# Patient Record
Sex: MALE | Race: WHITE | NOT HISPANIC OR LATINO | Employment: FULL TIME | URBAN - METROPOLITAN AREA
[De-identification: names, ages, dates, MRNs, and addresses within clinical notes are randomized per-mention and may not be internally consistent; named-entity substitution may affect disease eponyms.]

---

## 2023-03-03 LAB
ALANINE AMINOTRANSFERASE (SGPT) (U/L) IN SER/PLAS: 37 U/L (ref 10–52)
ALBUMIN (G/DL) IN SER/PLAS: 4.4 G/DL (ref 3.4–5)
ALKALINE PHOSPHATASE (U/L) IN SER/PLAS: 72 U/L (ref 33–120)
ANION GAP IN SER/PLAS: 13 MMOL/L (ref 10–20)
ASPARTATE AMINOTRANSFERASE (SGOT) (U/L) IN SER/PLAS: 51 U/L (ref 9–39)
BASOPHILS (10*3/UL) IN BLOOD BY AUTOMATED COUNT: 0.04 X10E9/L (ref 0–0.1)
BASOPHILS/100 LEUKOCYTES IN BLOOD BY AUTOMATED COUNT: 0.4 % (ref 0–2)
BILIRUBIN TOTAL (MG/DL) IN SER/PLAS: 0.5 MG/DL (ref 0–1.2)
C REACTIVE PROTEIN (MG/L) IN SER/PLAS: 0.66 MG/DL
CALCIUM (MG/DL) IN SER/PLAS: 9.5 MG/DL (ref 8.6–10.3)
CARBON DIOXIDE, TOTAL (MMOL/L) IN SER/PLAS: 25 MMOL/L (ref 21–32)
CHLORIDE (MMOL/L) IN SER/PLAS: 103 MMOL/L (ref 98–107)
CREATININE (MG/DL) IN SER/PLAS: 0.67 MG/DL (ref 0.5–1.3)
EOSINOPHILS (10*3/UL) IN BLOOD BY AUTOMATED COUNT: 0.32 X10E9/L (ref 0–0.7)
EOSINOPHILS/100 LEUKOCYTES IN BLOOD BY AUTOMATED COUNT: 3.5 % (ref 0–6)
ERYTHROCYTE DISTRIBUTION WIDTH (RATIO) BY AUTOMATED COUNT: 13.3 % (ref 11.5–14.5)
ERYTHROCYTE MEAN CORPUSCULAR HEMOGLOBIN CONCENTRATION (G/DL) BY AUTOMATED: 32.1 G/DL (ref 32–36)
ERYTHROCYTE MEAN CORPUSCULAR VOLUME (FL) BY AUTOMATED COUNT: 86 FL (ref 80–100)
ERYTHROCYTES (10*6/UL) IN BLOOD BY AUTOMATED COUNT: 5.49 X10E12/L (ref 4.5–5.9)
GFR MALE: >90 ML/MIN/1.73M2
GLUCOSE (MG/DL) IN SER/PLAS: 63 MG/DL (ref 74–99)
HEMATOCRIT (%) IN BLOOD BY AUTOMATED COUNT: 47.4 % (ref 41–52)
HEMOGLOBIN (G/DL) IN BLOOD: 15.2 G/DL (ref 13.5–17.5)
IMMATURE GRANULOCYTES/100 LEUKOCYTES IN BLOOD BY AUTOMATED COUNT: 0.3 % (ref 0–0.9)
LEUKOCYTES (10*3/UL) IN BLOOD BY AUTOMATED COUNT: 9.1 X10E9/L (ref 4.4–11.3)
LYMPHOCYTES (10*3/UL) IN BLOOD BY AUTOMATED COUNT: 2.72 X10E9/L (ref 1.2–4.8)
LYMPHOCYTES/100 LEUKOCYTES IN BLOOD BY AUTOMATED COUNT: 30 % (ref 13–44)
MONOCYTES (10*3/UL) IN BLOOD BY AUTOMATED COUNT: 0.7 X10E9/L (ref 0.1–1)
MONOCYTES/100 LEUKOCYTES IN BLOOD BY AUTOMATED COUNT: 7.7 % (ref 2–10)
NEUTROPHILS (10*3/UL) IN BLOOD BY AUTOMATED COUNT: 5.26 X10E9/L (ref 1.2–7.7)
NEUTROPHILS/100 LEUKOCYTES IN BLOOD BY AUTOMATED COUNT: 58.1 % (ref 40–80)
PLATELETS (10*3/UL) IN BLOOD AUTOMATED COUNT: 275 X10E9/L (ref 150–450)
POTASSIUM (MMOL/L) IN SER/PLAS: 5.2 MMOL/L (ref 3.5–5.3)
PROTEIN TOTAL: 7.4 G/DL (ref 6.4–8.2)
SEDIMENTATION RATE, ERYTHROCYTE: 11 MM/H (ref 0–15)
SODIUM (MMOL/L) IN SER/PLAS: 136 MMOL/L (ref 136–145)
UREA NITROGEN (MG/DL) IN SER/PLAS: 16 MG/DL (ref 6–23)

## 2023-04-14 LAB
ALANINE AMINOTRANSFERASE (SGPT) (U/L) IN SER/PLAS: 33 U/L (ref 10–52)
ALBUMIN (G/DL) IN SER/PLAS: 4.1 G/DL (ref 3.4–5)
ALKALINE PHOSPHATASE (U/L) IN SER/PLAS: 67 U/L (ref 33–120)
ANION GAP IN SER/PLAS: 14 MMOL/L (ref 10–20)
ASPARTATE AMINOTRANSFERASE (SGOT) (U/L) IN SER/PLAS: 25 U/L (ref 9–39)
BASOPHILS (10*3/UL) IN BLOOD BY AUTOMATED COUNT: 0.05 X10E9/L (ref 0–0.1)
BASOPHILS/100 LEUKOCYTES IN BLOOD BY AUTOMATED COUNT: 0.5 % (ref 0–2)
BILIRUBIN TOTAL (MG/DL) IN SER/PLAS: 0.7 MG/DL (ref 0–1.2)
C REACTIVE PROTEIN (MG/L) IN SER/PLAS: 0.72 MG/DL
CALCIUM (MG/DL) IN SER/PLAS: 8.8 MG/DL (ref 8.6–10.3)
CARBON DIOXIDE, TOTAL (MMOL/L) IN SER/PLAS: 23 MMOL/L (ref 21–32)
CHLORIDE (MMOL/L) IN SER/PLAS: 105 MMOL/L (ref 98–107)
CREATININE (MG/DL) IN SER/PLAS: 0.7 MG/DL (ref 0.5–1.3)
EOSINOPHILS (10*3/UL) IN BLOOD BY AUTOMATED COUNT: 0.29 X10E9/L (ref 0–0.7)
EOSINOPHILS/100 LEUKOCYTES IN BLOOD BY AUTOMATED COUNT: 2.7 % (ref 0–6)
ERYTHROCYTE DISTRIBUTION WIDTH (RATIO) BY AUTOMATED COUNT: 13 % (ref 11.5–14.5)
ERYTHROCYTE MEAN CORPUSCULAR HEMOGLOBIN CONCENTRATION (G/DL) BY AUTOMATED: 32.8 G/DL (ref 32–36)
ERYTHROCYTE MEAN CORPUSCULAR VOLUME (FL) BY AUTOMATED COUNT: 85 FL (ref 80–100)
ERYTHROCYTES (10*6/UL) IN BLOOD BY AUTOMATED COUNT: 5.34 X10E12/L (ref 4.5–5.9)
GFR MALE: >90 ML/MIN/1.73M2
GLUCOSE (MG/DL) IN SER/PLAS: 80 MG/DL (ref 74–99)
HEMATOCRIT (%) IN BLOOD BY AUTOMATED COUNT: 45.4 % (ref 41–52)
HEMOGLOBIN (G/DL) IN BLOOD: 14.9 G/DL (ref 13.5–17.5)
IMMATURE GRANULOCYTES/100 LEUKOCYTES IN BLOOD BY AUTOMATED COUNT: 0.3 % (ref 0–0.9)
LEUKOCYTES (10*3/UL) IN BLOOD BY AUTOMATED COUNT: 10.6 X10E9/L (ref 4.4–11.3)
LYMPHOCYTES (10*3/UL) IN BLOOD BY AUTOMATED COUNT: 3.54 X10E9/L (ref 1.2–4.8)
LYMPHOCYTES/100 LEUKOCYTES IN BLOOD BY AUTOMATED COUNT: 33.6 % (ref 13–44)
MONOCYTES (10*3/UL) IN BLOOD BY AUTOMATED COUNT: 0.77 X10E9/L (ref 0.1–1)
MONOCYTES/100 LEUKOCYTES IN BLOOD BY AUTOMATED COUNT: 7.3 % (ref 2–10)
NEUTROPHILS (10*3/UL) IN BLOOD BY AUTOMATED COUNT: 5.87 X10E9/L (ref 1.2–7.7)
NEUTROPHILS/100 LEUKOCYTES IN BLOOD BY AUTOMATED COUNT: 55.6 % (ref 40–80)
PLATELETS (10*3/UL) IN BLOOD AUTOMATED COUNT: 257 X10E9/L (ref 150–450)
POTASSIUM (MMOL/L) IN SER/PLAS: 3.7 MMOL/L (ref 3.5–5.3)
PROTEIN TOTAL: 6.9 G/DL (ref 6.4–8.2)
SEDIMENTATION RATE, ERYTHROCYTE: 15 MM/H (ref 0–15)
SODIUM (MMOL/L) IN SER/PLAS: 138 MMOL/L (ref 136–145)
UREA NITROGEN (MG/DL) IN SER/PLAS: 13 MG/DL (ref 6–23)

## 2023-05-26 LAB
ALANINE AMINOTRANSFERASE (SGPT) (U/L) IN SER/PLAS: 38 U/L (ref 10–52)
ALBUMIN (G/DL) IN SER/PLAS: 4.3 G/DL (ref 3.4–5)
ALKALINE PHOSPHATASE (U/L) IN SER/PLAS: 79 U/L (ref 33–120)
ANION GAP IN SER/PLAS: 11 MMOL/L (ref 10–20)
ASPARTATE AMINOTRANSFERASE (SGOT) (U/L) IN SER/PLAS: 29 U/L (ref 9–39)
BASOPHILS (10*3/UL) IN BLOOD BY AUTOMATED COUNT: 0.05 X10E9/L (ref 0–0.1)
BASOPHILS/100 LEUKOCYTES IN BLOOD BY AUTOMATED COUNT: 0.5 % (ref 0–2)
BILIRUBIN TOTAL (MG/DL) IN SER/PLAS: 0.6 MG/DL (ref 0–1.2)
C REACTIVE PROTEIN (MG/L) IN SER/PLAS: 0.59 MG/DL
CALCIUM (MG/DL) IN SER/PLAS: 9.4 MG/DL (ref 8.6–10.3)
CARBON DIOXIDE, TOTAL (MMOL/L) IN SER/PLAS: 28 MMOL/L (ref 21–32)
CHLORIDE (MMOL/L) IN SER/PLAS: 105 MMOL/L (ref 98–107)
CREATININE (MG/DL) IN SER/PLAS: 0.67 MG/DL (ref 0.5–1.3)
EOSINOPHILS (10*3/UL) IN BLOOD BY AUTOMATED COUNT: 0.37 X10E9/L (ref 0–0.7)
EOSINOPHILS/100 LEUKOCYTES IN BLOOD BY AUTOMATED COUNT: 4.1 % (ref 0–6)
ERYTHROCYTE DISTRIBUTION WIDTH (RATIO) BY AUTOMATED COUNT: 13.2 % (ref 11.5–14.5)
ERYTHROCYTE MEAN CORPUSCULAR HEMOGLOBIN CONCENTRATION (G/DL) BY AUTOMATED: 32.4 G/DL (ref 32–36)
ERYTHROCYTE MEAN CORPUSCULAR VOLUME (FL) BY AUTOMATED COUNT: 87 FL (ref 80–100)
ERYTHROCYTES (10*6/UL) IN BLOOD BY AUTOMATED COUNT: 5.78 X10E12/L (ref 4.5–5.9)
GFR MALE: >90 ML/MIN/1.73M2
GLUCOSE (MG/DL) IN SER/PLAS: 76 MG/DL (ref 74–99)
HEMATOCRIT (%) IN BLOOD BY AUTOMATED COUNT: 50 % (ref 41–52)
HEMOGLOBIN (G/DL) IN BLOOD: 16.2 G/DL (ref 13.5–17.5)
IMMATURE GRANULOCYTES/100 LEUKOCYTES IN BLOOD BY AUTOMATED COUNT: 0.3 % (ref 0–0.9)
LEUKOCYTES (10*3/UL) IN BLOOD BY AUTOMATED COUNT: 9.1 X10E9/L (ref 4.4–11.3)
LYMPHOCYTES (10*3/UL) IN BLOOD BY AUTOMATED COUNT: 3.19 X10E9/L (ref 1.2–4.8)
LYMPHOCYTES/100 LEUKOCYTES IN BLOOD BY AUTOMATED COUNT: 34.9 % (ref 13–44)
MONOCYTES (10*3/UL) IN BLOOD BY AUTOMATED COUNT: 0.61 X10E9/L (ref 0.1–1)
MONOCYTES/100 LEUKOCYTES IN BLOOD BY AUTOMATED COUNT: 6.7 % (ref 2–10)
NEUTROPHILS (10*3/UL) IN BLOOD BY AUTOMATED COUNT: 4.88 X10E9/L (ref 1.2–7.7)
NEUTROPHILS/100 LEUKOCYTES IN BLOOD BY AUTOMATED COUNT: 53.5 % (ref 40–80)
PLATELETS (10*3/UL) IN BLOOD AUTOMATED COUNT: 259 X10E9/L (ref 150–450)
POTASSIUM (MMOL/L) IN SER/PLAS: 3.9 MMOL/L (ref 3.5–5.3)
PROTEIN TOTAL: 7.3 G/DL (ref 6.4–8.2)
SEDIMENTATION RATE, ERYTHROCYTE: 10 MM/H (ref 0–15)
SODIUM (MMOL/L) IN SER/PLAS: 140 MMOL/L (ref 136–145)
UREA NITROGEN (MG/DL) IN SER/PLAS: 13 MG/DL (ref 6–23)

## 2023-07-07 LAB
ALANINE AMINOTRANSFERASE (SGPT) (U/L) IN SER/PLAS: 30 U/L (ref 10–52)
ALBUMIN (G/DL) IN SER/PLAS: 4 G/DL (ref 3.4–5)
ALKALINE PHOSPHATASE (U/L) IN SER/PLAS: 73 U/L (ref 33–120)
ANION GAP IN SER/PLAS: 15 MMOL/L (ref 10–20)
ASPARTATE AMINOTRANSFERASE (SGOT) (U/L) IN SER/PLAS: 22 U/L (ref 9–39)
BASOPHILS (10*3/UL) IN BLOOD BY AUTOMATED COUNT: 0.05 X10E9/L (ref 0–0.1)
BASOPHILS/100 LEUKOCYTES IN BLOOD BY AUTOMATED COUNT: 0.5 % (ref 0–2)
BILIRUBIN TOTAL (MG/DL) IN SER/PLAS: 0.5 MG/DL (ref 0–1.2)
C REACTIVE PROTEIN (MG/L) IN SER/PLAS: 0.92 MG/DL
CALCIUM (MG/DL) IN SER/PLAS: 8.8 MG/DL (ref 8.6–10.3)
CARBON DIOXIDE, TOTAL (MMOL/L) IN SER/PLAS: 24 MMOL/L (ref 21–32)
CHLORIDE (MMOL/L) IN SER/PLAS: 105 MMOL/L (ref 98–107)
CREATININE (MG/DL) IN SER/PLAS: 0.66 MG/DL (ref 0.5–1.3)
EOSINOPHILS (10*3/UL) IN BLOOD BY AUTOMATED COUNT: 0.3 X10E9/L (ref 0–0.7)
EOSINOPHILS/100 LEUKOCYTES IN BLOOD BY AUTOMATED COUNT: 3.2 % (ref 0–6)
ERYTHROCYTE DISTRIBUTION WIDTH (RATIO) BY AUTOMATED COUNT: 13 % (ref 11.5–14.5)
ERYTHROCYTE MEAN CORPUSCULAR HEMOGLOBIN CONCENTRATION (G/DL) BY AUTOMATED: 32.5 G/DL (ref 32–36)
ERYTHROCYTE MEAN CORPUSCULAR VOLUME (FL) BY AUTOMATED COUNT: 87 FL (ref 80–100)
ERYTHROCYTES (10*6/UL) IN BLOOD BY AUTOMATED COUNT: 5.25 X10E12/L (ref 4.5–5.9)
GFR MALE: >90 ML/MIN/1.73M2
GLUCOSE (MG/DL) IN SER/PLAS: 60 MG/DL (ref 74–99)
HEMATOCRIT (%) IN BLOOD BY AUTOMATED COUNT: 45.5 % (ref 41–52)
HEMOGLOBIN (G/DL) IN BLOOD: 14.8 G/DL (ref 13.5–17.5)
IMMATURE GRANULOCYTES/100 LEUKOCYTES IN BLOOD BY AUTOMATED COUNT: 0.1 % (ref 0–0.9)
LEUKOCYTES (10*3/UL) IN BLOOD BY AUTOMATED COUNT: 9.5 X10E9/L (ref 4.4–11.3)
LYMPHOCYTES (10*3/UL) IN BLOOD BY AUTOMATED COUNT: 3.33 X10E9/L (ref 1.2–4.8)
LYMPHOCYTES/100 LEUKOCYTES IN BLOOD BY AUTOMATED COUNT: 35.1 % (ref 13–44)
MONOCYTES (10*3/UL) IN BLOOD BY AUTOMATED COUNT: 0.71 X10E9/L (ref 0.1–1)
MONOCYTES/100 LEUKOCYTES IN BLOOD BY AUTOMATED COUNT: 7.5 % (ref 2–10)
NEUTROPHILS (10*3/UL) IN BLOOD BY AUTOMATED COUNT: 5.09 X10E9/L (ref 1.2–7.7)
NEUTROPHILS/100 LEUKOCYTES IN BLOOD BY AUTOMATED COUNT: 53.6 % (ref 40–80)
PLATELETS (10*3/UL) IN BLOOD AUTOMATED COUNT: 278 X10E9/L (ref 150–450)
POTASSIUM (MMOL/L) IN SER/PLAS: 3.6 MMOL/L (ref 3.5–5.3)
PROTEIN TOTAL: 6.9 G/DL (ref 6.4–8.2)
SEDIMENTATION RATE, ERYTHROCYTE: 17 MM/H (ref 0–15)
SODIUM (MMOL/L) IN SER/PLAS: 140 MMOL/L (ref 136–145)
UREA NITROGEN (MG/DL) IN SER/PLAS: 15 MG/DL (ref 6–23)

## 2023-09-30 ENCOUNTER — LAB (OUTPATIENT)
Dept: LAB | Facility: LAB | Age: 23
End: 2023-09-30
Payer: COMMERCIAL

## 2023-09-30 DIAGNOSIS — Z79.899 OTHER LONG TERM (CURRENT) DRUG THERAPY: ICD-10-CM

## 2023-09-30 DIAGNOSIS — K51.90 ULCERATIVE COLITIS, UNSPECIFIED, WITHOUT COMPLICATIONS (MULTI): Primary | ICD-10-CM

## 2023-09-30 LAB
ALBUMIN SERPL BCP-MCNC: 4.3 G/DL (ref 3.4–5)
ALP SERPL-CCNC: 74 U/L (ref 33–120)
ALT SERPL W P-5'-P-CCNC: 31 U/L (ref 10–52)
ANION GAP SERPL CALC-SCNC: 12 MMOL/L (ref 10–20)
AST SERPL W P-5'-P-CCNC: 22 U/L (ref 9–39)
BASOPHILS # BLD AUTO: 0.08 X10*3/UL (ref 0–0.1)
BASOPHILS NFR BLD AUTO: 0.6 %
BILIRUB SERPL-MCNC: 0.4 MG/DL (ref 0–1.2)
BUN SERPL-MCNC: 13 MG/DL (ref 6–23)
CALCIUM SERPL-MCNC: 9.2 MG/DL (ref 8.6–10.3)
CHLORIDE SERPL-SCNC: 102 MMOL/L (ref 98–107)
CO2 SERPL-SCNC: 28 MMOL/L (ref 21–32)
CREAT SERPL-MCNC: 0.71 MG/DL (ref 0.5–1.3)
CRP SERPL-MCNC: 0.9 MG/DL
EOSINOPHIL # BLD AUTO: 0.46 X10*3/UL (ref 0–0.7)
EOSINOPHIL NFR BLD AUTO: 3.5 %
ERYTHROCYTE [DISTWIDTH] IN BLOOD BY AUTOMATED COUNT: 12.6 % (ref 11.5–14.5)
ERYTHROCYTE [SEDIMENTATION RATE] IN BLOOD BY WESTERGREN METHOD: 15 MM/H (ref 0–15)
GFR SERPL CREATININE-BSD FRML MDRD: >90 ML/MIN/1.73M*2
GLUCOSE SERPL-MCNC: 78 MG/DL (ref 74–99)
HCT VFR BLD AUTO: 46.2 % (ref 41–52)
HGB BLD-MCNC: 15 G/DL (ref 13.5–17.5)
IMM GRANULOCYTES # BLD AUTO: 0.04 X10*3/UL (ref 0–0.7)
IMM GRANULOCYTES NFR BLD AUTO: 0.3 % (ref 0–0.9)
LYMPHOCYTES # BLD AUTO: 3.72 X10*3/UL (ref 1.2–4.8)
LYMPHOCYTES NFR BLD AUTO: 28.6 %
MCH RBC QN AUTO: 27.8 PG (ref 26–34)
MCHC RBC AUTO-ENTMCNC: 32.5 G/DL (ref 32–36)
MCV RBC AUTO: 86 FL (ref 80–100)
MONOCYTES # BLD AUTO: 0.99 X10*3/UL (ref 0.1–1)
MONOCYTES NFR BLD AUTO: 7.6 %
NEUTROPHILS # BLD AUTO: 7.73 X10*3/UL (ref 1.2–7.7)
NEUTROPHILS NFR BLD AUTO: 59.4 %
NRBC BLD-RTO: 0 /100 WBCS (ref 0–0)
PLATELET # BLD AUTO: 270 X10*3/UL (ref 150–450)
PMV BLD AUTO: 11.3 FL (ref 7.5–11.5)
POTASSIUM SERPL-SCNC: 4 MMOL/L (ref 3.5–5.3)
PROT SERPL-MCNC: 7.2 G/DL (ref 6.4–8.2)
RBC # BLD AUTO: 5.39 X10*6/UL (ref 4.5–5.9)
SODIUM SERPL-SCNC: 138 MMOL/L (ref 136–145)
WBC # BLD AUTO: 13 X10*3/UL (ref 4.4–11.3)

## 2023-09-30 PROCEDURE — 36415 COLL VENOUS BLD VENIPUNCTURE: CPT

## 2023-10-03 ENCOUNTER — TELEPHONE (OUTPATIENT)
Dept: GASTROENTEROLOGY | Facility: CLINIC | Age: 23
End: 2023-10-03
Payer: COMMERCIAL

## 2023-10-03 DIAGNOSIS — K51.00 ULCERATIVE PANCOLITIS WITHOUT COMPLICATION (MULTI): Primary | ICD-10-CM

## 2023-10-03 NOTE — RESULT ENCOUNTER NOTE
I called patient and notified him that CBC shows elevated WBC count of 13.  He is otherwise feeling well and no fevers.  Had recent Entyvio infusion a few days ago.  Will repeat CBC in 2 weeks.  He is in agreement with the plan.

## 2023-10-03 NOTE — TELEPHONE ENCOUNTER
Will repeat CBC in 2 weeks since recent CBC shows WBC of 13k.  Patient without symptoms.  He is in agreement with the plan.

## 2023-11-09 ENCOUNTER — TELEPHONE (OUTPATIENT)
Dept: INFUSION THERAPY | Age: 23
End: 2023-11-09
Payer: COMMERCIAL

## 2023-11-09 NOTE — TELEPHONE ENCOUNTER
Spoke with patient. Delivery will be tonight by 9 pm with the  to call ahead of time. Address has been verified. [facility] address.     Sending same supplies as last month for one dose of entyvio delivered peripherally via gravity + necessary flushes.     Patient had no questions for pharmacist.

## 2023-11-10 ENCOUNTER — HOME HEALTH ADMISSION (OUTPATIENT)
Dept: HOME HEALTH SERVICES | Facility: HOME HEALTH | Age: 23
End: 2023-11-10
Payer: COMMERCIAL

## 2023-11-10 ENCOUNTER — LAB REQUISITION (OUTPATIENT)
Dept: LAB | Facility: LAB | Age: 23
End: 2023-11-10
Payer: COMMERCIAL

## 2023-11-10 DIAGNOSIS — K51.90 ULCERATIVE COLITIS, UNSPECIFIED, WITHOUT COMPLICATIONS (MULTI): ICD-10-CM

## 2023-11-10 LAB
ALBUMIN SERPL BCP-MCNC: 4 G/DL (ref 3.4–5)
ALP SERPL-CCNC: 79 U/L (ref 33–120)
ALT SERPL W P-5'-P-CCNC: 61 U/L (ref 10–52)
ANION GAP SERPL CALC-SCNC: 13 MMOL/L (ref 10–20)
AST SERPL W P-5'-P-CCNC: 34 U/L (ref 9–39)
BASOPHILS # BLD AUTO: 0.06 X10*3/UL (ref 0–0.1)
BASOPHILS NFR BLD AUTO: 0.5 %
BILIRUB SERPL-MCNC: 0.3 MG/DL (ref 0–1.2)
BUN SERPL-MCNC: 18 MG/DL (ref 6–23)
CALCIUM SERPL-MCNC: 9.3 MG/DL (ref 8.6–10.3)
CHLORIDE SERPL-SCNC: 105 MMOL/L (ref 98–107)
CO2 SERPL-SCNC: 26 MMOL/L (ref 21–32)
CREAT SERPL-MCNC: 0.72 MG/DL (ref 0.5–1.3)
CRP SERPL-MCNC: 0.88 MG/DL
EOSINOPHIL # BLD AUTO: 0.54 X10*3/UL (ref 0–0.7)
EOSINOPHIL NFR BLD AUTO: 4.7 %
ERYTHROCYTE [DISTWIDTH] IN BLOOD BY AUTOMATED COUNT: 12.9 % (ref 11.5–14.5)
ERYTHROCYTE [SEDIMENTATION RATE] IN BLOOD BY WESTERGREN METHOD: 11 MM/H (ref 0–15)
GFR SERPL CREATININE-BSD FRML MDRD: >90 ML/MIN/1.73M*2
GLUCOSE SERPL-MCNC: 75 MG/DL (ref 74–99)
HCT VFR BLD AUTO: 50.6 % (ref 41–52)
HGB BLD-MCNC: 16.2 G/DL (ref 13.5–17.5)
IMM GRANULOCYTES # BLD AUTO: 0.03 X10*3/UL (ref 0–0.7)
IMM GRANULOCYTES NFR BLD AUTO: 0.3 % (ref 0–0.9)
LYMPHOCYTES # BLD AUTO: 4.27 X10*3/UL (ref 1.2–4.8)
LYMPHOCYTES NFR BLD AUTO: 37.1 %
MCH RBC QN AUTO: 28.1 PG (ref 26–34)
MCHC RBC AUTO-ENTMCNC: 32 G/DL (ref 32–36)
MCV RBC AUTO: 88 FL (ref 80–100)
MONOCYTES # BLD AUTO: 0.9 X10*3/UL (ref 0.1–1)
MONOCYTES NFR BLD AUTO: 7.8 %
NEUTROPHILS # BLD AUTO: 5.71 X10*3/UL (ref 1.2–7.7)
NEUTROPHILS NFR BLD AUTO: 49.6 %
NRBC BLD-RTO: 0 /100 WBCS (ref 0–0)
PLATELET # BLD AUTO: 271 X10*3/UL (ref 150–450)
POTASSIUM SERPL-SCNC: 4 MMOL/L (ref 3.5–5.3)
PROT SERPL-MCNC: 7 G/DL (ref 6.4–8.2)
RBC # BLD AUTO: 5.77 X10*6/UL (ref 4.5–5.9)
SODIUM SERPL-SCNC: 140 MMOL/L (ref 136–145)
WBC # BLD AUTO: 11.5 X10*3/UL (ref 4.4–11.3)

## 2023-11-10 PROCEDURE — 86140 C-REACTIVE PROTEIN: CPT

## 2023-11-10 PROCEDURE — 85652 RBC SED RATE AUTOMATED: CPT

## 2023-11-10 PROCEDURE — 80053 COMPREHEN METABOLIC PANEL: CPT

## 2023-11-10 PROCEDURE — 85025 COMPLETE CBC W/AUTO DIFF WBC: CPT

## 2023-11-23 ENCOUNTER — HOME CARE VISIT (OUTPATIENT)
Dept: HOME HEALTH SERVICES | Facility: HOME HEALTH | Age: 23
End: 2023-11-23
Payer: COMMERCIAL

## 2023-11-23 VITALS
DIASTOLIC BLOOD PRESSURE: 88 MMHG | HEART RATE: 83 BPM | SYSTOLIC BLOOD PRESSURE: 136 MMHG | TEMPERATURE: 97.8 F | OXYGEN SATURATION: 98 % | RESPIRATION RATE: 18 BRPM

## 2023-11-23 ASSESSMENT — ACTIVITIES OF DAILY LIVING (ADL)
OASIS_M1830: 00
ENTERING_EXITING_HOME: INDEPENDENT

## 2023-11-23 ASSESSMENT — ENCOUNTER SYMPTOMS
CHANGE IN APPETITE: UNCHANGED
PERSON REPORTING PAIN: PATIENT
DENIES PAIN: 1
LAST BOWEL MOVEMENT: 66800
APPETITE LEVEL: GOOD

## 2023-11-24 PROCEDURE — 400014 HH F/U

## 2023-12-05 PROCEDURE — G0179 MD RECERTIFICATION HHA PT: HCPCS | Performed by: INTERNAL MEDICINE

## 2023-12-15 ENCOUNTER — LAB (OUTPATIENT)
Dept: LAB | Facility: LAB | Age: 23
End: 2023-12-15
Payer: COMMERCIAL

## 2023-12-15 ENCOUNTER — OFFICE VISIT (OUTPATIENT)
Dept: GASTROENTEROLOGY | Facility: CLINIC | Age: 23
End: 2023-12-15
Payer: COMMERCIAL

## 2023-12-15 VITALS — HEIGHT: 69 IN | WEIGHT: 236 LBS | BODY MASS INDEX: 34.96 KG/M2

## 2023-12-15 DIAGNOSIS — K51.00 ULCERATIVE PANCOLITIS WITHOUT COMPLICATION (MULTI): ICD-10-CM

## 2023-12-15 DIAGNOSIS — K51.00 ULCERATIVE PANCOLITIS WITHOUT COMPLICATION (MULTI): Primary | ICD-10-CM

## 2023-12-15 PROCEDURE — 99213 OFFICE O/P EST LOW 20 MIN: CPT | Performed by: INTERNAL MEDICINE

## 2023-12-15 PROCEDURE — 86481 TB AG RESPONSE T-CELL SUSP: CPT

## 2023-12-15 PROCEDURE — 36415 COLL VENOUS BLD VENIPUNCTURE: CPT

## 2023-12-15 RX ORDER — CETIRIZINE HYDROCHLORIDE 10 MG/1
TABLET ORAL
COMMUNITY
End: 2024-03-15

## 2023-12-17 LAB
NIL(NEG) CONTROL SPOT COUNT: NORMAL
PANEL A SPOT COUNT: 0
PANEL B SPOT COUNT: 0
POS CONTROL SPOT COUNT: NORMAL
T-SPOT. TB INTERPRETATION: NEGATIVE

## 2023-12-20 ENCOUNTER — TELEPHONE (OUTPATIENT)
Dept: INFUSION THERAPY | Age: 23
End: 2023-12-20

## 2023-12-22 ENCOUNTER — HOME CARE VISIT (OUTPATIENT)
Dept: HOME HEALTH SERVICES | Facility: HOME HEALTH | Age: 23
End: 2023-12-22
Payer: COMMERCIAL

## 2023-12-22 ENCOUNTER — LAB (OUTPATIENT)
Dept: LAB | Facility: LAB | Age: 23
End: 2023-12-22
Payer: COMMERCIAL

## 2023-12-22 VITALS
DIASTOLIC BLOOD PRESSURE: 82 MMHG | SYSTOLIC BLOOD PRESSURE: 111 MMHG | RESPIRATION RATE: 16 BRPM | HEART RATE: 95 BPM | TEMPERATURE: 97.8 F | OXYGEN SATURATION: 99 %

## 2023-12-22 DIAGNOSIS — K51.00 ULCERATIVE PANCOLITIS WITHOUT COMPLICATION (MULTI): ICD-10-CM

## 2023-12-22 DIAGNOSIS — K51.00 ULCERATIVE (CHRONIC) PANCOLITIS WITHOUT COMPLICATIONS (MULTI): Primary | ICD-10-CM

## 2023-12-22 DIAGNOSIS — K51.90 ULCERATIVE COLITIS, UNSPECIFIED, WITHOUT COMPLICATIONS (MULTI): ICD-10-CM

## 2023-12-22 LAB
ALBUMIN SERPL BCP-MCNC: 4.3 G/DL (ref 3.4–5)
ALP SERPL-CCNC: 62 U/L (ref 33–120)
ALT SERPL W P-5'-P-CCNC: 28 U/L (ref 10–52)
ANION GAP SERPL CALC-SCNC: 12 MMOL/L (ref 10–20)
AST SERPL W P-5'-P-CCNC: 24 U/L (ref 9–39)
BASOPHILS # BLD AUTO: 0.05 X10*3/UL (ref 0–0.1)
BASOPHILS NFR BLD AUTO: 0.5 %
BILIRUB SERPL-MCNC: 0.8 MG/DL (ref 0–1.2)
BUN SERPL-MCNC: 14 MG/DL (ref 6–23)
CALCIUM SERPL-MCNC: 9.3 MG/DL (ref 8.6–10.3)
CHLORIDE SERPL-SCNC: 106 MMOL/L (ref 98–107)
CO2 SERPL-SCNC: 26 MMOL/L (ref 21–32)
CREAT SERPL-MCNC: 0.67 MG/DL (ref 0.5–1.3)
CRP SERPL-MCNC: 0.33 MG/DL
EOSINOPHIL # BLD AUTO: 0.31 X10*3/UL (ref 0–0.7)
EOSINOPHIL NFR BLD AUTO: 3.2 %
ERYTHROCYTE [DISTWIDTH] IN BLOOD BY AUTOMATED COUNT: 12.8 % (ref 11.5–14.5)
ERYTHROCYTE [SEDIMENTATION RATE] IN BLOOD BY WESTERGREN METHOD: 8 MM/H (ref 0–15)
GFR SERPL CREATININE-BSD FRML MDRD: >90 ML/MIN/1.73M*2
GLUCOSE SERPL-MCNC: 79 MG/DL (ref 74–99)
HCT VFR BLD AUTO: 47.3 % (ref 41–52)
HGB BLD-MCNC: 15.2 G/DL (ref 13.5–17.5)
IMM GRANULOCYTES # BLD AUTO: 0.03 X10*3/UL (ref 0–0.7)
IMM GRANULOCYTES NFR BLD AUTO: 0.3 % (ref 0–0.9)
LYMPHOCYTES # BLD AUTO: 3.76 X10*3/UL (ref 1.2–4.8)
LYMPHOCYTES NFR BLD AUTO: 38.3 %
MCH RBC QN AUTO: 27.7 PG (ref 26–34)
MCHC RBC AUTO-ENTMCNC: 32.1 G/DL (ref 32–36)
MCV RBC AUTO: 86 FL (ref 80–100)
MONOCYTES # BLD AUTO: 0.8 X10*3/UL (ref 0.1–1)
MONOCYTES NFR BLD AUTO: 8.1 %
NEUTROPHILS # BLD AUTO: 4.88 X10*3/UL (ref 1.2–7.7)
NEUTROPHILS NFR BLD AUTO: 49.6 %
NRBC BLD-RTO: 0 /100 WBCS (ref 0–0)
PLATELET # BLD AUTO: 246 X10*3/UL (ref 150–450)
POTASSIUM SERPL-SCNC: 3.9 MMOL/L (ref 3.5–5.3)
PROT SERPL-MCNC: 7 G/DL (ref 6.4–8.2)
RBC # BLD AUTO: 5.48 X10*6/UL (ref 4.5–5.9)
SODIUM SERPL-SCNC: 140 MMOL/L (ref 136–145)
WBC # BLD AUTO: 9.8 X10*3/UL (ref 4.4–11.3)

## 2023-12-22 PROCEDURE — 400014 HH F/U

## 2023-12-22 PROCEDURE — 36415 COLL VENOUS BLD VENIPUNCTURE: CPT

## 2023-12-22 PROCEDURE — G0299 HHS/HOSPICE OF RN EA 15 MIN: HCPCS

## 2023-12-22 PROCEDURE — 99601 HOME NFS VISIT <2 HRS: CPT

## 2023-12-22 ASSESSMENT — ENCOUNTER SYMPTOMS
APPETITE LEVEL: GOOD
LAST BOWEL MOVEMENT: 66829
DENIES PAIN: 1
CHANGE IN APPETITE: UNCHANGED
PERSON REPORTING PAIN: PATIENT

## 2024-01-19 ENCOUNTER — HOME CARE VISIT (OUTPATIENT)
Dept: HOME HEALTH SERVICES | Facility: HOME HEALTH | Age: 24
End: 2024-01-19
Payer: COMMERCIAL

## 2024-01-19 VITALS
TEMPERATURE: 97.7 F | DIASTOLIC BLOOD PRESSURE: 88 MMHG | HEART RATE: 74 BPM | SYSTOLIC BLOOD PRESSURE: 125 MMHG | RESPIRATION RATE: 18 BRPM | OXYGEN SATURATION: 97 %

## 2024-01-19 PROCEDURE — 400014 HH F/U

## 2024-01-19 PROCEDURE — 99601 HOME NFS VISIT <2 HRS: CPT

## 2024-01-19 PROCEDURE — G0299 HHS/HOSPICE OF RN EA 15 MIN: HCPCS

## 2024-01-19 ASSESSMENT — ACTIVITIES OF DAILY LIVING (ADL)
OASIS_M1830: 00
ENTERING_EXITING_HOME: INDEPENDENT

## 2024-01-19 ASSESSMENT — ENCOUNTER SYMPTOMS
PERSON REPORTING PAIN: PATIENT
DENIES PAIN: 1
CHANGE IN APPETITE: UNCHANGED
APPETITE LEVEL: GOOD
LAST BOWEL MOVEMENT: 66857
OCCASIONAL FEELINGS OF UNSTEADINESS: 0

## 2024-01-23 PROCEDURE — 400014 HH F/U

## 2024-01-29 PROCEDURE — G0179 MD RECERTIFICATION HHA PT: HCPCS | Performed by: INTERNAL MEDICINE

## 2024-02-01 ENCOUNTER — TELEPHONE (OUTPATIENT)
Dept: INFUSION THERAPY | Age: 24
End: 2024-02-01
Payer: COMMERCIAL

## 2024-02-01 NOTE — TELEPHONE ENCOUNTER
Spoke with patient. Delivery will be tonight by 9 pm with the  to call ahead of time. Address has been verified. [facility] address.      Sending same supplies as last month for one dose of entyvio delivered peripherally via gravity + necessary flushes.    Patient's only question was to confirm we were not sending premeds as he does not take them. Confirmed with him we are not sending.

## 2024-02-02 ENCOUNTER — HOME CARE VISIT (OUTPATIENT)
Dept: HOME HEALTH SERVICES | Facility: HOME HEALTH | Age: 24
End: 2024-02-02
Payer: COMMERCIAL

## 2024-02-02 VITALS
RESPIRATION RATE: 18 BRPM | TEMPERATURE: 98.2 F | SYSTOLIC BLOOD PRESSURE: 116 MMHG | HEART RATE: 79 BPM | DIASTOLIC BLOOD PRESSURE: 78 MMHG

## 2024-02-02 PROCEDURE — G0299 HHS/HOSPICE OF RN EA 15 MIN: HCPCS

## 2024-02-02 PROCEDURE — 400014 HH F/U

## 2024-02-02 PROCEDURE — 99601 HOME NFS VISIT <2 HRS: CPT

## 2024-02-02 ASSESSMENT — ENCOUNTER SYMPTOMS
DENIES PAIN: 1
LAST BOWEL MOVEMENT: 66871
PERSON REPORTING PAIN: PATIENT
APPETITE LEVEL: GOOD
CHANGE IN APPETITE: UNCHANGED

## 2024-02-10 ENCOUNTER — HOSPITAL ENCOUNTER (EMERGENCY)
Facility: HOSPITAL | Age: 24
Discharge: HOME | End: 2024-02-10
Payer: COMMERCIAL

## 2024-02-10 VITALS
WEIGHT: 238.1 LBS | OXYGEN SATURATION: 100 % | BODY MASS INDEX: 35.27 KG/M2 | HEART RATE: 111 BPM | HEIGHT: 69 IN | SYSTOLIC BLOOD PRESSURE: 120 MMHG | TEMPERATURE: 98.3 F | DIASTOLIC BLOOD PRESSURE: 71 MMHG | RESPIRATION RATE: 18 BRPM

## 2024-02-10 DIAGNOSIS — Z20.2 STD EXPOSURE: Primary | ICD-10-CM

## 2024-02-10 PROCEDURE — 96372 THER/PROPH/DIAG INJ SC/IM: CPT

## 2024-02-10 PROCEDURE — 99284 EMERGENCY DEPT VISIT MOD MDM: CPT

## 2024-02-10 PROCEDURE — 2500000001 HC RX 250 WO HCPCS SELF ADMINISTERED DRUGS (ALT 637 FOR MEDICARE OP): Performed by: PHYSICIAN ASSISTANT

## 2024-02-10 PROCEDURE — 2500000004 HC RX 250 GENERAL PHARMACY W/ HCPCS (ALT 636 FOR OP/ED): Performed by: PHYSICIAN ASSISTANT

## 2024-02-10 PROCEDURE — 87800 DETECT AGNT MULT DNA DIREC: CPT | Mod: TRILAB,WESLAB | Performed by: PHYSICIAN ASSISTANT

## 2024-02-10 PROCEDURE — 99283 EMERGENCY DEPT VISIT LOW MDM: CPT

## 2024-02-10 RX ORDER — CEFTRIAXONE 500 MG/1
500 INJECTION, POWDER, FOR SOLUTION INTRAMUSCULAR; INTRAVENOUS ONCE
Status: COMPLETED | OUTPATIENT
Start: 2024-02-10 | End: 2024-02-10

## 2024-02-10 RX ORDER — AZITHROMYCIN 500 MG/1
1000 TABLET, FILM COATED ORAL ONCE
Status: COMPLETED | OUTPATIENT
Start: 2024-02-10 | End: 2024-02-10

## 2024-02-10 RX ADMIN — CEFTRIAXONE SODIUM 500 MG: 500 INJECTION, POWDER, FOR SOLUTION INTRAMUSCULAR; INTRAVENOUS at 19:19

## 2024-02-10 RX ADMIN — AZITHROMYCIN DIHYDRATE 1000 MG: 500 TABLET ORAL at 19:19

## 2024-02-10 ASSESSMENT — COLUMBIA-SUICIDE SEVERITY RATING SCALE - C-SSRS
1. IN THE PAST MONTH, HAVE YOU WISHED YOU WERE DEAD OR WISHED YOU COULD GO TO SLEEP AND NOT WAKE UP?: NO
2. HAVE YOU ACTUALLY HAD ANY THOUGHTS OF KILLING YOURSELF?: NO
6. HAVE YOU EVER DONE ANYTHING, STARTED TO DO ANYTHING, OR PREPARED TO DO ANYTHING TO END YOUR LIFE?: NO

## 2024-02-11 NOTE — ED PROVIDER NOTES
HPI   Chief Complaint   Patient presents with    Exposure to STD     My girlfriend tested positive for chlamydia        23-year-old male presented emergency department with a chief complaint of exposure to chlamydia.  States his girlfriend was recently diagnosed with chlamydia.  He is not having penile discharge and denies urinary frequency or urgency at this time but would like empirically treated and tested.  He denies concern for HIV AIDS.  No other complaint.                          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    Curvature of spine    Elevated C-reactive protein (CRP)     Elevated C-reactive protein (CRP)    Elevated erythrocyte sedimentation rate     Elevated erythrocyte sedimentation rate    Encounter for screening for osteoporosis 08/30/2019    Osteoporosis screening    Long term (current) use of immunosuppressive biologic 05/17/2019    Infliximab (Remicade) long-term use    Otitis media, unspecified, left ear 10/24/2017    Left otitis media    Personal history of diseases of the skin and subcutaneous tissue 01/09/2018    History of acne    Personal history of immunosuppression therapy 09/22/2020    History of immunosuppression    Personal history of other diseases of the musculoskeletal system and connective tissue 09/17/2019    History of back pain    Personal history of other diseases of the nervous system and sense organs 09/30/2014    History of otitis media    Personal history of other diseases of the respiratory system     History of sore throat    Personal history of other diseases of the respiratory system 02/26/2020    History of sore throat    Personal history of other endocrine, nutritional and metabolic disease 09/11/2015    History of vitamin D deficiency    Personal history of other specified conditions 11/14/2019    History of diarrhea    Personal history of other  specified conditions 02/07/2018    History of abdominal pain    Personal history of other specified conditions 12/13/2017    History of diarrhea    Unspecified otitis externa, left ear 10/24/2017    Left otitis externa     Past Surgical History:   Procedure Laterality Date    OTHER SURGICAL HISTORY  05/17/2019    Colonoscopy    OTHER SURGICAL HISTORY  05/17/2019    Esophagogastroduodenoscopy     No family history on file.  Social History     Tobacco Use    Smoking status: Some Days     Types: Cigarettes     Passive exposure: Never    Smokeless tobacco: Never   Substance Use Topics    Alcohol use: Not on file    Drug use: Not on file       Physical Exam   ED Triage Vitals [02/10/24 1902]   Temperature Heart Rate Respirations BP   36.8 °C (98.3 °F) (!) 111 18 120/71      Pulse Ox Temp Source Heart Rate Source Patient Position   100 % Temporal Monitor Sitting      BP Location FiO2 (%)     Left arm --       Physical Exam  Vitals and nursing note reviewed.   Constitutional:       Appearance: Normal appearance.   HENT:      Head: Normocephalic.      Nose: Nose normal.      Mouth/Throat:      Mouth: Mucous membranes are moist.   Cardiovascular:      Rate and Rhythm: Normal rate and regular rhythm.   Pulmonary:      Breath sounds: Normal breath sounds.   Abdominal:      General: Abdomen is flat.      Palpations: Abdomen is soft.   Musculoskeletal:         General: Normal range of motion.      Cervical back: Normal range of motion.   Skin:     General: Skin is warm and dry.   Neurological:      General: No focal deficit present.      Mental Status: He is alert and oriented to person, place, and time.   Psychiatric:         Mood and Affect: Mood normal.         ED Course & MDM   Diagnoses as of 02/10/24 1904   STD exposure       Medical Decision Making  I have seen and evaluated this patient.  Physician available for consultation.  Vital signs have been reviewed.  All laboratory and diagnostic imaging is reviewed by myself  and interpreted by myself unless otherwise stated.  Additionally imaging is interpreted by radiologist.    Patient will be tested for gonorrhea chlamydia.  He understands this is not fully comprehensive STD testing.  Empirically treated for chlamydia based on exposure.  Released in good condition to follow with regular physician.    Labs Reviewed  URINALYSIS WITH REFLEX CULTURE AND MICROSCOPIC       Narrative: The following orders were created for panel order Urinalysis with Reflex Culture and Microscopic.                Procedure                               Abnormality         Status                                   ---------                               -----------         ------                                   Urinalysis with Reflex C...[[id number]]                                                               Extra Urine Gray Tube[[id number]]                                                                                     Please view results for these tests on the individual orders.  C. TRACHOMATIS + N. GONORRHOEAE, AMPLIFIED  URINALYSIS WITH REFLEX CULTURE AND MICROSCOPIC  EXTRA URINE GRAY TUBE  No orders to display  Medications  cefTRIAXone (Rocephin) vial 500 mg (has no administration in time range)  azithromycin (Zithromax) tablet 1,000 mg (has no administration in time range)  New Prescriptions  No medications on file            Procedure  Procedures     Dakota Rivera PA-C  02/10/24 1919

## 2024-02-12 LAB
C TRACH RRNA SPEC QL NAA+PROBE: NEGATIVE
N GONORRHOEA DNA SPEC QL PROBE+SIG AMP: NEGATIVE

## 2024-03-13 ENCOUNTER — HOME INFUSION (OUTPATIENT)
Dept: INFUSION THERAPY | Age: 24
End: 2024-03-13
Payer: COMMERCIAL

## 2024-03-13 NOTE — PROGRESS NOTES
CHART REVIEW - PATIENT ORDERED ENTYVIO [id number] FOR UC  NEXT INFUSION 3/15 - CONFIRMED WITH [facility] RN  DOES NOT TAKE PO PREMEDS    PER RN VISIT NOTES FROM LAST INFUSION - PATIENT TOLERATING INFUSION WELL - NO COMPLAINTS OF UC SYMPTOMS    AUTH GOOD THROUGH 2/20/25  RX IN DATE thru 8/20/24 march FOTM COMPLETE     PROCESSED FILL FOR THE FOLLOWING FOR STRAIGHT DELIVERY 3/14 FOR INFUSION ON 3/15:  1 X ENTYVIO 300MG VIAL  1 X 10ML SWFI DILUENT   1 X 250ML NS  1 X 50ML NS     NEXT DOSE DUE APPROX 4/26    FOLLOW UP 4/18 TO CHECK DATE and forward in the book AS APPROPRIATE

## 2024-03-14 ENCOUNTER — TELEPHONE (OUTPATIENT)
Dept: INFUSION THERAPY | Age: 24
End: 2024-03-14
Payer: COMMERCIAL

## 2024-03-14 NOTE — TELEPHONE ENCOUNTER
Spoke with patient. Delivery will be tonight by 9 pm with the  to call ahead of time. Same address as last delivery confirmed. Home address on [facility].     Sending drug, standard supplies for 1 dose of Entyvio  delivered peripherally  via Gravity + necessary flushes. Pt does not take premeds.     Patient had no questions for pharmacist.

## 2024-03-15 ENCOUNTER — HOME CARE VISIT (OUTPATIENT)
Dept: HOME HEALTH SERVICES | Facility: HOME HEALTH | Age: 24
End: 2024-03-15
Payer: COMMERCIAL

## 2024-03-15 VITALS
HEART RATE: 72 BPM | TEMPERATURE: 98.2 F | RESPIRATION RATE: 16 BRPM | DIASTOLIC BLOOD PRESSURE: 53 MMHG | SYSTOLIC BLOOD PRESSURE: 115 MMHG

## 2024-03-15 PROCEDURE — G0299 HHS/HOSPICE OF RN EA 15 MIN: HCPCS

## 2024-03-15 PROCEDURE — 400014 HH F/U

## 2024-03-15 PROCEDURE — 99601 HOME NFS VISIT <2 HRS: CPT

## 2024-03-15 ASSESSMENT — ENCOUNTER SYMPTOMS
CHANGE IN APPETITE: UNCHANGED
PERSON REPORTING PAIN: PATIENT
DENIES PAIN: 1
APPETITE LEVEL: GOOD
LAST BOWEL MOVEMENT: 66913

## 2024-03-22 ENCOUNTER — HOME CARE VISIT (OUTPATIENT)
Dept: HOME HEALTH SERVICES | Facility: HOME HEALTH | Age: 24
End: 2024-03-22
Payer: COMMERCIAL

## 2024-03-22 VITALS
OXYGEN SATURATION: 99 % | HEART RATE: 78 BPM | TEMPERATURE: 98.3 F | RESPIRATION RATE: 16 BRPM | DIASTOLIC BLOOD PRESSURE: 85 MMHG | SYSTOLIC BLOOD PRESSURE: 118 MMHG

## 2024-03-22 PROCEDURE — G0299 HHS/HOSPICE OF RN EA 15 MIN: HCPCS

## 2024-03-22 PROCEDURE — 99601 HOME NFS VISIT <2 HRS: CPT

## 2024-03-22 ASSESSMENT — ACTIVITIES OF DAILY LIVING (ADL)
OASIS_M1830: 00
ENTERING_EXITING_HOME: INDEPENDENT

## 2024-03-22 ASSESSMENT — ENCOUNTER SYMPTOMS
PERSON REPORTING PAIN: PATIENT
DENIES PAIN: 1
APPETITE LEVEL: GOOD
CHANGE IN APPETITE: UNCHANGED
LAST BOWEL MOVEMENT: 66920

## 2024-03-23 PROCEDURE — 400014 HH F/U

## 2024-04-19 ENCOUNTER — APPOINTMENT (OUTPATIENT)
Dept: RADIOLOGY | Facility: HOSPITAL | Age: 24
End: 2024-04-19
Payer: COMMERCIAL

## 2024-04-19 ENCOUNTER — HOSPITAL ENCOUNTER (EMERGENCY)
Facility: HOSPITAL | Age: 24
Discharge: HOME | End: 2024-04-19
Attending: EMERGENCY MEDICINE
Payer: COMMERCIAL

## 2024-04-19 VITALS
HEIGHT: 69 IN | BODY MASS INDEX: 34.58 KG/M2 | RESPIRATION RATE: 16 BRPM | HEART RATE: 79 BPM | SYSTOLIC BLOOD PRESSURE: 120 MMHG | WEIGHT: 233.47 LBS | TEMPERATURE: 98.4 F | DIASTOLIC BLOOD PRESSURE: 75 MMHG | OXYGEN SATURATION: 98 %

## 2024-04-19 DIAGNOSIS — J01.00 SUBACUTE MAXILLARY SINUSITIS: ICD-10-CM

## 2024-04-19 DIAGNOSIS — G44.86 CERVICOGENIC HEADACHE: Primary | ICD-10-CM

## 2024-04-19 PROCEDURE — 96374 THER/PROPH/DIAG INJ IV PUSH: CPT | Performed by: EMERGENCY MEDICINE

## 2024-04-19 PROCEDURE — 96375 TX/PRO/DX INJ NEW DRUG ADDON: CPT | Performed by: EMERGENCY MEDICINE

## 2024-04-19 PROCEDURE — 72125 CT NECK SPINE W/O DYE: CPT

## 2024-04-19 PROCEDURE — 2500000004 HC RX 250 GENERAL PHARMACY W/ HCPCS (ALT 636 FOR OP/ED): Performed by: EMERGENCY MEDICINE

## 2024-04-19 PROCEDURE — 72125 CT NECK SPINE W/O DYE: CPT | Performed by: STUDENT IN AN ORGANIZED HEALTH CARE EDUCATION/TRAINING PROGRAM

## 2024-04-19 PROCEDURE — 70450 CT HEAD/BRAIN W/O DYE: CPT | Performed by: STUDENT IN AN ORGANIZED HEALTH CARE EDUCATION/TRAINING PROGRAM

## 2024-04-19 PROCEDURE — 99284 EMERGENCY DEPT VISIT MOD MDM: CPT | Mod: 25 | Performed by: EMERGENCY MEDICINE

## 2024-04-19 PROCEDURE — 70450 CT HEAD/BRAIN W/O DYE: CPT

## 2024-04-19 PROCEDURE — 96361 HYDRATE IV INFUSION ADD-ON: CPT | Performed by: EMERGENCY MEDICINE

## 2024-04-19 RX ORDER — ONDANSETRON HYDROCHLORIDE 2 MG/ML
4 INJECTION, SOLUTION INTRAVENOUS ONCE
Status: COMPLETED | OUTPATIENT
Start: 2024-04-19 | End: 2024-04-19

## 2024-04-19 RX ORDER — CYCLOBENZAPRINE HCL 10 MG
10 TABLET ORAL 3 TIMES DAILY PRN
Qty: 20 TABLET | Refills: 0 | Status: SHIPPED | OUTPATIENT
Start: 2024-04-19 | End: 2024-04-29

## 2024-04-19 RX ORDER — AMOXICILLIN 500 MG/1
500 CAPSULE ORAL 3 TIMES DAILY
Qty: 30 CAPSULE | Refills: 0 | Status: SHIPPED | OUTPATIENT
Start: 2024-04-19 | End: 2024-04-29

## 2024-04-19 RX ORDER — KETOROLAC TROMETHAMINE 30 MG/ML
15 INJECTION, SOLUTION INTRAMUSCULAR; INTRAVENOUS ONCE
Status: COMPLETED | OUTPATIENT
Start: 2024-04-19 | End: 2024-04-19

## 2024-04-19 RX ADMIN — ONDANSETRON 4 MG: 2 INJECTION INTRAMUSCULAR; INTRAVENOUS at 01:20

## 2024-04-19 RX ADMIN — SODIUM CHLORIDE 1000 ML: 900 INJECTION, SOLUTION INTRAVENOUS at 01:20

## 2024-04-19 RX ADMIN — KETOROLAC TROMETHAMINE 15 MG: 30 INJECTION, SOLUTION INTRAMUSCULAR at 01:19

## 2024-04-19 ASSESSMENT — COLUMBIA-SUICIDE SEVERITY RATING SCALE - C-SSRS
2. HAVE YOU ACTUALLY HAD ANY THOUGHTS OF KILLING YOURSELF?: NO
6. HAVE YOU EVER DONE ANYTHING, STARTED TO DO ANYTHING, OR PREPARED TO DO ANYTHING TO END YOUR LIFE?: NO
1. IN THE PAST MONTH, HAVE YOU WISHED YOU WERE DEAD OR WISHED YOU COULD GO TO SLEEP AND NOT WAKE UP?: NO

## 2024-04-19 ASSESSMENT — PAIN - FUNCTIONAL ASSESSMENT: PAIN_FUNCTIONAL_ASSESSMENT: 0-10

## 2024-04-19 ASSESSMENT — PAIN DESCRIPTION - FREQUENCY: FREQUENCY: CONSTANT/CONTINUOUS

## 2024-04-19 ASSESSMENT — PAIN SCALES - GENERAL: PAINLEVEL_OUTOF10: 3

## 2024-04-19 ASSESSMENT — PAIN DESCRIPTION - ORIENTATION: ORIENTATION: LEFT;POSTERIOR

## 2024-04-19 ASSESSMENT — PAIN DESCRIPTION - LOCATION: LOCATION: HEAD

## 2024-04-19 ASSESSMENT — PAIN DESCRIPTION - PAIN TYPE: TYPE: ACUTE PAIN

## 2024-04-19 ASSESSMENT — PAIN DESCRIPTION - ONSET: ONSET: ONGOING

## 2024-04-19 ASSESSMENT — PAIN DESCRIPTION - PROGRESSION: CLINICAL_PROGRESSION: NOT CHANGED

## 2024-04-19 ASSESSMENT — PAIN DESCRIPTION - DESCRIPTORS: DESCRIPTORS: SHARP;SHOOTING

## 2024-04-19 NOTE — Clinical Note
Torres Silvadarriusnatalee was seen and treated in our emergency department on 4/19/2024.  He may return to work on 04/20/2024.       If you have any questions or concerns, please don't hesitate to call.      Boaz Yost MD

## 2024-04-19 NOTE — DISCHARGE INSTRUCTIONS
Tylenol, heat and muscle creams as needed at home.  Return to the ER for new or worsening symptoms.  Follow-up with your primary care doctor as soon as possible
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
## 2024-04-24 ENCOUNTER — HOME INFUSION (OUTPATIENT)
Dept: INFUSION THERAPY | Age: 24
End: 2024-04-24
Payer: COMMERCIAL

## 2024-04-24 NOTE — PROGRESS NOTES
CHART REVIEW - PATIENT ORDERED ENTYVIO [id number] FOR UC  NEXT INFUSION 4/26 - CONFIRMED WITH [facility] RN  DOES NOT TAKE PO PREMEDS     PER RN VISIT NOTES FROM LAST INFUSION - PATIENT TOLERATING INFUSION WELL - NO COMPLAINTS OF UC SYMPTOMS     AUTH GOOD THROUGH 2/20/25  RX IN DATE thru 8/20/24 April FOTM COMPLETE      PROCESSED FILL FOR THE FOLLOWING FOR STRAIGHT DELIVERY 4/25 FOR INFUSION ON 4/26:  1 X ENTYVIO 300MG VIAL  1 X 10ML SWFI DILUENT   1 X 250ML NS  1 X 50ML NS   DOS: 4/26 - 6/6    NEXT DOSE DUE APPROX 6/7     FOLLOW UP 5/30 TO CHECK DATE and forward in the book AS APPROPRIATE. Add to June FOTM

## 2024-04-25 ENCOUNTER — TELEPHONE (OUTPATIENT)
Dept: INFUSION THERAPY | Age: 24
End: 2024-04-25
Payer: COMMERCIAL

## 2024-04-25 NOTE — TELEPHONE ENCOUNTER
Spoke with patient. Delivery will be anytime today by 9 pm with the  to call ahead of time. Same address as last delivery confirmed. Default delivery address changed to home address on [address]     Sending drug, standard supplies for 1 dose of Entyvio  delivered peripherally  via Gravity + necessary flushes but no premeds per pt request.     Patient had no questions for pharmacist.

## 2024-04-26 ENCOUNTER — HOME CARE VISIT (OUTPATIENT)
Dept: HOME HEALTH SERVICES | Facility: HOME HEALTH | Age: 24
End: 2024-04-26
Payer: COMMERCIAL

## 2024-04-26 VITALS
TEMPERATURE: 98 F | SYSTOLIC BLOOD PRESSURE: 143 MMHG | RESPIRATION RATE: 16 BRPM | DIASTOLIC BLOOD PRESSURE: 82 MMHG | HEART RATE: 77 BPM

## 2024-04-26 PROCEDURE — G0299 HHS/HOSPICE OF RN EA 15 MIN: HCPCS

## 2024-04-26 PROCEDURE — 99601 HOME NFS VISIT <2 HRS: CPT

## 2024-04-26 PROCEDURE — 400014 HH F/U

## 2024-04-26 ASSESSMENT — ENCOUNTER SYMPTOMS
PAIN LOCATION - EXACERBATING FACTORS: NOTHING IN PARTICULAR
PAIN LOCATION - PAIN SEVERITY: 2/10
PAIN SEVERITY GOAL: 0/10
SUBJECTIVE PAIN PROGRESSION: GRADUALLY IMPROVING
PAIN LOCATION - PAIN DURATION: RECENT ONSET
PAIN LOCATION: NECK
HIGHEST PAIN SEVERITY IN PAST 24 HOURS: 2/10
PAIN LOCATION - RELIEVING FACTORS: NOTHING
CHANGE IN APPETITE: UNCHANGED
APPETITE LEVEL: GOOD
LOWEST PAIN SEVERITY IN PAST 24 HOURS: 0/10
LAST BOWEL MOVEMENT: 66955
PAIN LOCATION - PAIN QUALITY: DULL
PAIN LOCATION - PAIN FREQUENCY: INTERMITTENT

## 2024-05-17 ENCOUNTER — HOME CARE VISIT (OUTPATIENT)
Dept: HOME HEALTH SERVICES | Facility: HOME HEALTH | Age: 24
End: 2024-05-17
Payer: COMMERCIAL

## 2024-05-17 VITALS
DIASTOLIC BLOOD PRESSURE: 68 MMHG | RESPIRATION RATE: 16 BRPM | HEART RATE: 77 BPM | OXYGEN SATURATION: 98 % | SYSTOLIC BLOOD PRESSURE: 114 MMHG | TEMPERATURE: 98.3 F

## 2024-05-17 PROCEDURE — G0299 HHS/HOSPICE OF RN EA 15 MIN: HCPCS

## 2024-05-17 PROCEDURE — 99601 HOME NFS VISIT <2 HRS: CPT

## 2024-05-17 ASSESSMENT — ENCOUNTER SYMPTOMS
PERSON REPORTING PAIN: PATIENT
CHANGE IN APPETITE: UNCHANGED
DENIES PAIN: 1
OCCASIONAL FEELINGS OF UNSTEADINESS: 0
LAST BOWEL MOVEMENT: 66976
APPETITE LEVEL: GOOD

## 2024-05-17 ASSESSMENT — ACTIVITIES OF DAILY LIVING (ADL)
OASIS_M1830: 00
ENTERING_EXITING_HOME: INDEPENDENT

## 2024-05-22 PROCEDURE — 400014 HH F/U

## 2024-06-04 ENCOUNTER — HOME INFUSION (OUTPATIENT)
Dept: INFUSION THERAPY | Age: 24
End: 2024-06-04
Payer: COMMERCIAL

## 2024-06-04 NOTE — PROGRESS NOTES
PATIENT CONTINUES ENTYVIO INFUSIONS Q6W FOR UC.  RN DOING NEXT INFUSOIN ON 6/7.    PATIENT CONTINUES TO TOLERATE INFUSIONS WELL.  JUNE FOTM COMPLETE AND AUTH GOOD THROUGH 2/20/25.  RN DOING INFUISON ON 6/7.  PHARMACY TO DELIVER ON 6/6.  PROCESSED FILL FOR THE FOLLOWING FOR [facility] THURSDAY 6/6.  1 ENTYVIO 300MG VIAL  1 SWFI 10ML DILUENT  1 250 ML NS AND 1 50-ML NS    DOS 6/7 THRU 7/18.,    NEXT DOSE DUE 7/19.  RPH TO F/U AROUND 7/8 TO CHECK ON INFUSION DATE. DSL

## 2024-06-06 ENCOUNTER — TELEPHONE (OUTPATIENT)
Dept: INFUSION THERAPY | Age: 24
End: 2024-06-06
Payer: COMMERCIAL

## 2024-06-06 NOTE — TELEPHONE ENCOUNTER
Spoke with patient. Delivery will be tonight by 9 pm with the  to call ahead of time. Same address as last delivery confirmed. Home address on [address].  to leave on back porch if not home.     Sending drug, standard supplies for 1 dose of Entyvio  delivered peripherally  via Gravity + necessary flushes. No premeds.     Patient had no questions for pharmacist.

## 2024-06-07 ENCOUNTER — HOME CARE VISIT (OUTPATIENT)
Dept: HOME HEALTH SERVICES | Facility: HOME HEALTH | Age: 24
End: 2024-06-07
Payer: COMMERCIAL

## 2024-06-07 VITALS
DIASTOLIC BLOOD PRESSURE: 73 MMHG | SYSTOLIC BLOOD PRESSURE: 115 MMHG | OXYGEN SATURATION: 96 % | TEMPERATURE: 98.4 F | RESPIRATION RATE: 18 BRPM | HEART RATE: 93 BPM

## 2024-06-07 PROCEDURE — G0299 HHS/HOSPICE OF RN EA 15 MIN: HCPCS

## 2024-06-07 PROCEDURE — 99601 HOME NFS VISIT <2 HRS: CPT

## 2024-06-07 PROCEDURE — 400014 HH F/U

## 2024-06-07 RX ADMIN — SODIUM CHLORIDE 50 ML: 9 INJECTION, SOLUTION INTRAVENOUS at 10:03

## 2024-06-07 RX ADMIN — Medication 10 ML: at 09:35

## 2024-06-07 ASSESSMENT — ENCOUNTER SYMPTOMS
DENIES PAIN: 1
APPETITE LEVEL: GOOD
PERSON REPORTING PAIN: PATIENT
LAST BOWEL MOVEMENT: 66997
CHANGE IN APPETITE: UNCHANGED

## 2024-06-21 ENCOUNTER — LAB (OUTPATIENT)
Dept: LAB | Facility: LAB | Age: 24
End: 2024-06-21
Payer: COMMERCIAL

## 2024-06-21 ENCOUNTER — APPOINTMENT (OUTPATIENT)
Dept: GASTROENTEROLOGY | Facility: CLINIC | Age: 24
End: 2024-06-21
Payer: COMMERCIAL

## 2024-06-21 VITALS — HEIGHT: 69 IN | HEART RATE: 87 BPM | BODY MASS INDEX: 34.51 KG/M2 | WEIGHT: 233 LBS

## 2024-06-21 DIAGNOSIS — K51.00 ULCERATIVE PANCOLITIS WITHOUT COMPLICATION (MULTI): ICD-10-CM

## 2024-06-21 DIAGNOSIS — K51.00 ULCERATIVE PANCOLITIS WITHOUT COMPLICATION (MULTI): Primary | ICD-10-CM

## 2024-06-21 LAB
ALBUMIN SERPL BCP-MCNC: 4.7 G/DL (ref 3.4–5)
ALP SERPL-CCNC: 77 U/L (ref 33–120)
ALT SERPL W P-5'-P-CCNC: 28 U/L (ref 10–52)
ANION GAP SERPL CALC-SCNC: 14 MMOL/L (ref 10–20)
AST SERPL W P-5'-P-CCNC: 21 U/L (ref 9–39)
BASOPHILS # BLD AUTO: 0.05 X10*3/UL (ref 0–0.1)
BASOPHILS NFR BLD AUTO: 0.5 %
BILIRUB SERPL-MCNC: 0.6 MG/DL (ref 0–1.2)
BUN SERPL-MCNC: 15 MG/DL (ref 6–23)
CALCIUM SERPL-MCNC: 10.5 MG/DL (ref 8.6–10.6)
CHLORIDE SERPL-SCNC: 103 MMOL/L (ref 98–107)
CO2 SERPL-SCNC: 29 MMOL/L (ref 21–32)
CREAT SERPL-MCNC: 0.78 MG/DL (ref 0.5–1.3)
CRP SERPL-MCNC: 0.46 MG/DL
EGFRCR SERPLBLD CKD-EPI 2021: >90 ML/MIN/1.73M*2
EOSINOPHIL # BLD AUTO: 0.33 X10*3/UL (ref 0–0.7)
EOSINOPHIL NFR BLD AUTO: 3.3 %
ERYTHROCYTE [DISTWIDTH] IN BLOOD BY AUTOMATED COUNT: 13.1 % (ref 11.5–14.5)
GLUCOSE SERPL-MCNC: 88 MG/DL (ref 74–99)
HCT VFR BLD AUTO: 50.5 % (ref 41–52)
HGB BLD-MCNC: 16.3 G/DL (ref 13.5–17.5)
IMM GRANULOCYTES # BLD AUTO: 0.03 X10*3/UL (ref 0–0.7)
IMM GRANULOCYTES NFR BLD AUTO: 0.3 % (ref 0–0.9)
LYMPHOCYTES # BLD AUTO: 2.49 X10*3/UL (ref 1.2–4.8)
LYMPHOCYTES NFR BLD AUTO: 25.1 %
MCH RBC QN AUTO: 28.5 PG (ref 26–34)
MCHC RBC AUTO-ENTMCNC: 32.3 G/DL (ref 32–36)
MCV RBC AUTO: 88 FL (ref 80–100)
MONOCYTES # BLD AUTO: 0.92 X10*3/UL (ref 0.1–1)
MONOCYTES NFR BLD AUTO: 9.3 %
NEUTROPHILS # BLD AUTO: 6.09 X10*3/UL (ref 1.2–7.7)
NEUTROPHILS NFR BLD AUTO: 61.5 %
NRBC BLD-RTO: 0 /100 WBCS (ref 0–0)
PLATELET # BLD AUTO: 282 X10*3/UL (ref 150–450)
POTASSIUM SERPL-SCNC: 4.6 MMOL/L (ref 3.5–5.3)
PROT SERPL-MCNC: 7.7 G/DL (ref 6.4–8.2)
RBC # BLD AUTO: 5.72 X10*6/UL (ref 4.5–5.9)
SODIUM SERPL-SCNC: 141 MMOL/L (ref 136–145)
WBC # BLD AUTO: 9.9 X10*3/UL (ref 4.4–11.3)

## 2024-06-21 PROCEDURE — 85025 COMPLETE CBC W/AUTO DIFF WBC: CPT

## 2024-06-21 PROCEDURE — 36415 COLL VENOUS BLD VENIPUNCTURE: CPT

## 2024-06-21 PROCEDURE — 80053 COMPREHEN METABOLIC PANEL: CPT

## 2024-06-21 PROCEDURE — 99213 OFFICE O/P EST LOW 20 MIN: CPT | Performed by: INTERNAL MEDICINE

## 2024-06-21 PROCEDURE — 86140 C-REACTIVE PROTEIN: CPT

## 2024-06-21 NOTE — PROGRESS NOTES
REASON FOR VISIT: Follow-up ulcerative colitis    HPI:  Torres Bales is a 23 y.o. male with a past medical history of severe pancolitis now almost 20-year history being evaluated in the office for follow-up.  Has been managed on Entyvio infusions for almost 3 years.  Colonoscopy little over a year ago normal mucosa no evidence of active colitis.  Feels well.  No diarrhea or rectal bleeding.  No abdominal pain symptoms.  Has been compliant with therapy which she gets home care Entyvio infusions.          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    Curvature of spine    Elevated C-reactive protein (CRP)     Elevated C-reactive protein (CRP)    Elevated erythrocyte sedimentation rate     Elevated erythrocyte sedimentation rate    Encounter for screening for osteoporosis 08/30/2019    Osteoporosis screening    Long term (current) use of immunosuppressive biologic 05/17/2019    Infliximab (Remicade) long-term use    Otitis media, unspecified, left ear 10/24/2017    Left otitis media    Personal history of diseases of the skin and subcutaneous tissue 01/09/2018    History of acne    Personal history of immunosuppression therapy 09/22/2020    History of immunosuppression    Personal history of other diseases of the musculoskeletal system and connective tissue 09/17/2019    History of back pain    Personal history of other diseases of the nervous system and sense organs 09/30/2014    History of otitis media    Personal history of other diseases of the respiratory system     History of sore throat    Personal history of other diseases of the respiratory system 02/26/2020    History of sore throat    Personal history of other endocrine, nutritional and metabolic disease 09/11/2015    History of vitamin D deficiency    Personal history of other specified conditions 11/14/2019    History of diarrhea    Personal history of other specified  conditions 02/07/2018    History of abdominal pain    Personal history of other specified conditions 12/13/2017    History of diarrhea    Unspecified otitis externa, left ear 10/24/2017    Left otitis externa       PAST SURGICAL HISTORY  Past Surgical History:   Procedure Laterality Date    OTHER SURGICAL HISTORY  05/17/2019    Colonoscopy    OTHER SURGICAL HISTORY  05/17/2019    Esophagogastroduodenoscopy       FAMILY HISTORY  No family history on file.    SOCIAL HISTORY  Social History     Tobacco Use    Smoking status: Some Days     Types: Cigarettes     Passive exposure: Never    Smokeless tobacco: Never   Substance Use Topics    Alcohol use: Not on file       REVIEW OF SYSTEMS  CONSTITUTIONAL: negative for fever, chills, fatigue, or unintentional weight loss,   HEENT: negative for icteric sclera, eye pain/redness, or changes in vision/hearing  RESPIRATORY: negative for cough, hemoptysis, wheezing, orthopnea, or dyspnea on exertion  CARDIOVASCULAR: negative for chest pain, palpitations, or syncope   GASTROINTESTINAL: as noted per HPI  GENITOURINARY: negative for dysuria, polyuria, incontinence, or hematuria  MUSCULOSKELETAL: negative for arthralgia, myalgia, or joint swelling/stiffness   INTEGUMENTARY/SKIN: negative jaundice, rash, or skin lesion  HEMATOLOGIC/LYMPHATIC: negative for prolonged bleeding, easy bruising, or swollen lymph nodes  ENDOCRINE: negative for cold/heat intolerance, polydipsia, polyuria, or goiter  NEUROLOGIC: negative for headaches, dizziness, tremor, or gait abnormality  PSYCHIATRIC: negative for anxiety, depression, personality changes, or sleep disturbances      A 10 point review of systems was completed and was otherwise negative.    ALLERGIES  Allergies   Allergen Reactions    Aminocaproic Acid Unknown       MEDICATIONS  Current Outpatient Medications   Medication Sig Dispense Refill    0.9 % sodium chloride (sodium chloride 0.9%) solution Infuse 50 mL into a venous catheter if needed  "(line care).      omeprazole (PriLOSEC) 40 mg DR capsule Take 1 capsule (40 mg) by mouth early in the morning..      sodium chloride 0.9% (sodium chloride) flush Infuse 10 mL into a venous catheter if needed for line care.      vedolizumab (Entyvio) 300 mg injection Infuse 300 mg into a venous catheter if needed (colitis).      cyclobenzaprine (Flexeril) 10 mg tablet Take 1 tablet (10 mg) by mouth 3 times a day as needed for muscle spasms for up to 10 days. 20 tablet 0     No current facility-administered medications for this visit.       VITALS  Pulse 87   Ht 1.753 m (5' 9\")   Wt 106 kg (233 lb)   BMI 34.41 kg/m²      PHYSICAL EXAM  Alert and oriented in no acute distress  Abdomen is soft without focal tenderness to palpation, no hepatosplenomegaly    ASSESSMENT/ PLAN  Patient with history of UC pancolitis managed on biologic therapy with Entyvio infusions for almost 3 years.  Will continue current therapy.  He is doing well.  Will check CBC, comprehensive panel and CRP.  Will see me back in 6 months.  He is in agreement with the plan.        Signature: Speedy Constantino MD    Date: 6/21/2024  Time: 2:39 PM    "

## 2024-07-16 ENCOUNTER — HOME INFUSION (OUTPATIENT)
Dept: INFUSION THERAPY | Age: 24
End: 2024-07-16
Payer: COMMERCIAL

## 2024-07-16 NOTE — PROGRESS NOTES
Chart review - patient ordered Entyvio q6 weeks for UC  Next infusion 7/19 - confirmed with homecare RN  Does not take PO premeds     Per RN visit notes from last infusion - patient tolerating infusion well - no complaints of UC symptoms     Auth good through 2/20/25  Rx in date thru 8/20/24 July FOTM complete      Processed fill for the following for OVN delivery 7/17 for infusion on 7/19:  1x Entyvio 300mg vial  1x 10ml SWFI diluent   1x 250ml NS  1x 50ml NS   DOS 7/19-8/29     Next dose due approx 8/30. New orders needed for next infusion - request faxed to Dr Constantino     Follow up 7/22 to check if new Rx received - forward in the book as appropriate

## 2024-07-17 ENCOUNTER — TELEPHONE (OUTPATIENT)
Dept: INFUSION THERAPY | Age: 24
End: 2024-07-17
Payer: COMMERCIAL

## 2024-07-17 NOTE — TELEPHONE ENCOUNTER
Spoke with patient. Delivery will be  tomorrow by 9 with the  to call ahead of time. Same address as last delivery confirmed. This  patient will be using the [facility] address as his regular delivery address going forward.     Sending drug, standard supplies for 1 dose of Entyvio  delivered peripherally  via Gravity + necessary flushes. No premeds per pt.      Patient had no questions for pharmacist.

## 2024-07-19 ENCOUNTER — HOME CARE VISIT (OUTPATIENT)
Dept: HOME HEALTH SERVICES | Facility: HOME HEALTH | Age: 24
End: 2024-07-19
Payer: COMMERCIAL

## 2024-07-19 VITALS
RESPIRATION RATE: 16 BRPM | TEMPERATURE: 98.5 F | OXYGEN SATURATION: 95 % | DIASTOLIC BLOOD PRESSURE: 80 MMHG | HEART RATE: 88 BPM | SYSTOLIC BLOOD PRESSURE: 111 MMHG

## 2024-07-19 PROCEDURE — G0299 HHS/HOSPICE OF RN EA 15 MIN: HCPCS

## 2024-07-19 PROCEDURE — 99601 HOME NFS VISIT <2 HRS: CPT

## 2024-07-19 ASSESSMENT — ENCOUNTER SYMPTOMS
APPETITE LEVEL: GOOD
DENIES PAIN: 1
CHANGE IN APPETITE: UNCHANGED
LAST BOWEL MOVEMENT: 67040
PERSON REPORTING PAIN: PATIENT

## 2024-07-19 ASSESSMENT — ACTIVITIES OF DAILY LIVING (ADL)
OASIS_M1830: 00
ENTERING_EXITING_HOME: INDEPENDENT

## 2024-08-03 DIAGNOSIS — K21.9 GASTROESOPHAGEAL REFLUX DISEASE, UNSPECIFIED WHETHER ESOPHAGITIS PRESENT: ICD-10-CM

## 2024-08-05 RX ORDER — OMEPRAZOLE 40 MG/1
40 CAPSULE, DELAYED RELEASE ORAL DAILY
Qty: 90 CAPSULE | Refills: 2 | Status: SHIPPED | OUTPATIENT
Start: 2024-08-05 | End: 2024-08-09 | Stop reason: SDUPTHER

## 2024-08-08 PROBLEM — K51.90 ULCERATIVE COLITIS (MULTI): Status: ACTIVE | Noted: 2024-08-08

## 2024-08-08 PROBLEM — L02.92 RECURRENT BOILS: Status: ACTIVE | Noted: 2024-08-08

## 2024-08-08 PROBLEM — L70.0 ACNE VULGARIS: Status: ACTIVE | Noted: 2018-08-24

## 2024-08-08 PROBLEM — K44.9 HIATAL HERNIA: Status: ACTIVE | Noted: 2024-08-08

## 2024-08-08 PROBLEM — R52 GENERALIZED PAIN: Status: ACTIVE | Noted: 2024-08-08

## 2024-08-08 PROBLEM — R19.7 DIARRHEA: Status: ACTIVE | Noted: 2024-08-08

## 2024-08-08 PROBLEM — M43.9 CURVATURE OF SPINE: Status: ACTIVE | Noted: 2024-08-08

## 2024-08-08 PROBLEM — L70.9 ACNE: Status: ACTIVE | Noted: 2024-08-08

## 2024-08-08 PROBLEM — L85.3 XEROSIS CUTIS: Status: ACTIVE | Noted: 2018-08-24

## 2024-08-08 PROBLEM — K21.9 GASTROESOPHAGEAL REFLUX DISEASE: Status: ACTIVE | Noted: 2024-08-08

## 2024-08-09 ENCOUNTER — APPOINTMENT (OUTPATIENT)
Dept: PRIMARY CARE | Facility: CLINIC | Age: 24
End: 2024-08-09
Payer: COMMERCIAL

## 2024-08-09 VITALS
DIASTOLIC BLOOD PRESSURE: 70 MMHG | OXYGEN SATURATION: 98 % | BODY MASS INDEX: 34.66 KG/M2 | HEART RATE: 75 BPM | SYSTOLIC BLOOD PRESSURE: 118 MMHG | HEIGHT: 69 IN | WEIGHT: 234 LBS

## 2024-08-09 DIAGNOSIS — Z11.59 ENCOUNTER FOR HEPATITIS C SCREENING TEST FOR LOW RISK PATIENT: ICD-10-CM

## 2024-08-09 DIAGNOSIS — Z79.899 IMMUNOSUPPRESSION DUE TO DRUG THERAPY (MULTI): ICD-10-CM

## 2024-08-09 DIAGNOSIS — Z79.899 LONG-TERM CURRENT USE OF PROTON PUMP INHIBITOR THERAPY: ICD-10-CM

## 2024-08-09 DIAGNOSIS — Z13.220 LIPID SCREENING: ICD-10-CM

## 2024-08-09 DIAGNOSIS — Z23 IMMUNIZATION DUE: ICD-10-CM

## 2024-08-09 DIAGNOSIS — K44.9 HIATAL HERNIA: ICD-10-CM

## 2024-08-09 DIAGNOSIS — K51.20 ULCERATIVE PROCTITIS WITHOUT COMPLICATION (MULTI): ICD-10-CM

## 2024-08-09 DIAGNOSIS — D84.821 IMMUNOSUPPRESSION DUE TO DRUG THERAPY (MULTI): ICD-10-CM

## 2024-08-09 DIAGNOSIS — K21.9 GASTROESOPHAGEAL REFLUX DISEASE, UNSPECIFIED WHETHER ESOPHAGITIS PRESENT: Primary | ICD-10-CM

## 2024-08-09 RX ORDER — OMEPRAZOLE 40 MG/1
40 CAPSULE, DELAYED RELEASE ORAL DAILY
Qty: 90 CAPSULE | Refills: 1 | Status: SHIPPED | OUTPATIENT
Start: 2024-08-09

## 2024-08-09 ASSESSMENT — PATIENT HEALTH QUESTIONNAIRE - PHQ9
1. LITTLE INTEREST OR PLEASURE IN DOING THINGS: NOT AT ALL
2. FEELING DOWN, DEPRESSED OR HOPELESS: NOT AT ALL
SUM OF ALL RESPONSES TO PHQ9 QUESTIONS 1 AND 2: 0

## 2024-08-09 ASSESSMENT — ENCOUNTER SYMPTOMS
FREQUENCY: 0
WHEEZING: 0
SINUS PAIN: 0
PALPITATIONS: 0
MYALGIAS: 0
SLEEP DISTURBANCE: 0
VOMITING: 0
CHILLS: 0
LIGHT-HEADEDNESS: 0
SHORTNESS OF BREATH: 0
HEADACHES: 0
FATIGUE: 0
WOUND: 0
NAUSEA: 0
FEVER: 0
CONSTIPATION: 0
NERVOUS/ANXIOUS: 0
DYSPHORIC MOOD: 0
ARTHRALGIAS: 0
DIARRHEA: 0
DYSURIA: 0
COUGH: 0
SINUS PRESSURE: 0
POLYDIPSIA: 0
DIZZINESS: 0
RHINORRHEA: 0

## 2024-08-09 ASSESSMENT — PAIN SCALES - GENERAL: PAINLEVEL: 0-NO PAIN

## 2024-08-09 NOTE — ASSESSMENT & PLAN NOTE
Orders:    Pneumococcal conjugate vaccine, 20-valent (PREVNAR 20)    Vitamin B12; Future    Vitamin D 25-Hydroxy,Total (for eval of Vitamin D levels); Future  Will obtain appropriate vitamin deficiency labs for patient disease state.  Can have this done with next set of labs for GI.     On review of guidelines, patient history and lab studies, it appears this patient does not require a DEXA scan at this time.  Will follow calcium, vitamin D levels.

## 2024-08-09 NOTE — ASSESSMENT & PLAN NOTE
Orders:    omeprazole (PriLOSEC) 40 mg DR capsule; Take 1 capsule (40 mg) by mouth once daily.  Patient with GERD secondary to hiatal hernia.  Long term PPI therapy is appropriate in this situation.  Should monitor CMP and magnesium periodically.

## 2024-08-09 NOTE — PROGRESS NOTES
"Subjective   Patient ID: Torres Bales is a 23 y.o. male who presents for Establish Care (Tdap  last done 2014  will get booster today/Does NOT want CPE today) and GERD (On omeprazole 40mg daily PRN only-  requesting refill  ).    Here today to establish care.  Known history of ulcerative colitis, follows with Dr. Adler for [facility].     Does smoke most days.  Not interested in quitting at this time.  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     Has been on omeprazole due to GERD, Otherwise, denies current complaints today.         Review of Systems   Constitutional:  Negative for chills, fatigue and fever.   HENT:  Negative for congestion, hearing loss, rhinorrhea, sinus pressure, sinus pain and tinnitus.    Eyes:  Negative for visual disturbance.   Respiratory:  Negative for cough, shortness of breath and wheezing.    Cardiovascular:  Negative for chest pain, palpitations and leg swelling.   Gastrointestinal:  Negative for constipation, diarrhea, nausea and vomiting.   Endocrine: Negative for cold intolerance, heat intolerance, polydipsia and polyuria.   Genitourinary:  Negative for dysuria, frequency and urgency.   Musculoskeletal:  Negative for arthralgias and myalgias.   Skin:  Negative for pallor, rash and wound.   Neurological:  Negative for dizziness, light-headedness and headaches.   Psychiatric/Behavioral:  Negative for dysphoric mood and sleep disturbance. The patient is not nervous/anxious.        Objective     Visit Vitals  /70 (BP Location: Left arm, Patient Position: Sitting, BP Cuff Size: Large adult)   Pulse 75   Ht 1.746 m (5' 8.75\")   Wt 106 kg (234 lb)   SpO2 98%   BMI 34.81 kg/m²   Smoking Status Some Days   BSA 2.27 m²         Physical Exam  Constitutional:       Appearance: Normal appearance. He is obese.   HENT:      Head: Normocephalic and atraumatic.      Right Ear: Tympanic membrane, ear canal and external ear " normal.      Left Ear: Tympanic membrane, ear canal and external ear normal.      Nose: Nose normal.      Mouth/Throat:      Mouth: Mucous membranes are moist.      Pharynx: Oropharynx is clear.   Eyes:      Extraocular Movements: Extraocular movements intact.      Conjunctiva/sclera: Conjunctivae normal.      Pupils: Pupils are equal, round, and reactive to light.   Cardiovascular:      Rate and Rhythm: Normal rate and regular rhythm.      Pulses: Normal pulses.      Heart sounds: Normal heart sounds.   Pulmonary:      Effort: Pulmonary effort is normal.      Breath sounds: Normal breath sounds.   Abdominal:      General: There is no distension.      Palpations: Abdomen is soft.      Tenderness: There is no abdominal tenderness.   Musculoskeletal:         General: Normal range of motion.      Cervical back: Normal range of motion and neck supple.   Lymphadenopathy:      Cervical: No cervical adenopathy.   Skin:     General: Skin is warm and dry.   Neurological:      Mental Status: He is alert and oriented to person, place, and time. Mental status is at baseline.   Psychiatric:         Mood and Affect: Mood normal.         Behavior: Behavior normal.         Assessment & Plan  Gastroesophageal reflux disease, unspecified whether esophagitis present    Orders:    omeprazole (PriLOSEC) 40 mg DR capsule; Take 1 capsule (40 mg) by mouth once daily.    Immunization due    Orders:    Tdap vaccine, age 7 years and older  (BOOSTRIX)    Pneumococcal conjugate vaccine, 20-valent (PREVNAR 20)    Lipid screening    Orders:    Lipid panel; Future    Long-term current use of proton pump inhibitor therapy    Orders:    Magnesium; Future    Immunosuppression due to drug therapy (Multi)    Orders:    Pneumococcal conjugate vaccine, 20-valent (PREVNAR 20)    Vitamin D 25-Hydroxy,Total (for eval of Vitamin D levels); Future  As patient uses tobacco and is on monoclonal antibody therapy for his ulcerative colitis, pneumococcal  immunization is indicated.  Patient agreeable.   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  Can have this done with next set of labs for GI.     On review of guidelines, patient history and lab studies, it appears this patient does not require a DEXA scan at this time.  Will follow calcium, vitamin D levels.   Encounter for hepatitis C screening test for low risk patient    Orders:    Hepatitis C antibody; Future    Hiatal hernia    Orders:    omeprazole (PriLOSEC) 40 mg DR capsule; Take 1 capsule (40 mg) by mouth once daily.  Patient with GERD secondary to hiatal hernia.  Long term PPI therapy is appropriate in this situation.  Should monitor CMP and magnesium periodically.        To obtain labs with next draw for GI.  Will follow up with results.  RTC 1 year, sooner PRN.     Reviewed and approved by OSWALDO TURNER on 8/9/24 at 10:29 AM.

## 2024-08-09 NOTE — ASSESSMENT & PLAN NOTE
Orders:    omeprazole (PriLOSEC) 40 mg DR capsule; Take 1 capsule (40 mg) by mouth once daily.

## 2024-08-26 ENCOUNTER — HOME INFUSION (OUTPATIENT)
Dept: INFUSION THERAPY | Age: 24
End: 2024-08-26
Payer: COMMERCIAL

## 2024-08-26 NOTE — PROGRESS NOTES
Chart review - patient ordered Entyvio q6 weeks for UC  Next infusion 8/30 - confirmed with homecare RN  Does not take PO premeds     New orders received 7/18/24    Auth good through 2/20/25  Aug FO complete   [employer] in date     Processed fill for the following for delivery 8/29 for infusion on 8/30:  1x Entyvio 300mg vial  1x 10ml SWFI diluent   1x 250ml NS  1x 50ml NS   DOS 8/30-10/9     Next dose due approx 10/10     Follow up 10/3 check next infusion date

## 2024-08-29 ENCOUNTER — TELEPHONE (OUTPATIENT)
Dept: INFUSION THERAPY | Age: 24
End: 2024-08-29
Payer: COMMERCIAL

## 2024-08-29 NOTE — TELEPHONE ENCOUNTER
Spoke with patient. Delivery will be tonight by 9 pm  with the  to call ahead of time. Same address as last delivery confirmed.     Sending drug, standard supplies for 1 dose of Entyvio  q6w delivered peripherally  via [facility] + necessary flushes. Patient does not take premeds.     Patient had no questions for pharmacist.

## 2024-08-30 ENCOUNTER — HOME CARE VISIT (OUTPATIENT)
Dept: HOME HEALTH SERVICES | Facility: HOME HEALTH | Age: 24
End: 2024-08-30
Payer: COMMERCIAL

## 2024-08-30 VITALS
RESPIRATION RATE: 18 BRPM | TEMPERATURE: 98.2 F | SYSTOLIC BLOOD PRESSURE: 121 MMHG | DIASTOLIC BLOOD PRESSURE: 87 MMHG | HEART RATE: 83 BPM

## 2024-08-30 PROCEDURE — 99601 HOME NFS VISIT <2 HRS: CPT

## 2024-08-30 PROCEDURE — G0299 HHS/HOSPICE OF RN EA 15 MIN: HCPCS

## 2024-08-30 ASSESSMENT — ENCOUNTER SYMPTOMS
LAST BOWEL MOVEMENT: 67081
DENIES PAIN: 1
PERSON REPORTING PAIN: PATIENT
CHANGE IN APPETITE: UNCHANGED
APPETITE LEVEL: GOOD

## 2024-09-18 ENCOUNTER — HOME CARE VISIT (OUTPATIENT)
Dept: HOME HEALTH SERVICES | Facility: HOME HEALTH | Age: 24
End: 2024-09-18
Payer: COMMERCIAL

## 2024-09-18 VITALS
SYSTOLIC BLOOD PRESSURE: 114 MMHG | RESPIRATION RATE: 16 BRPM | TEMPERATURE: 98.3 F | DIASTOLIC BLOOD PRESSURE: 78 MMHG | OXYGEN SATURATION: 96 % | HEART RATE: 95 BPM

## 2024-09-18 PROCEDURE — 99601 HOME NFS VISIT <2 HRS: CPT

## 2024-09-18 PROCEDURE — G0299 HHS/HOSPICE OF RN EA 15 MIN: HCPCS

## 2024-09-18 ASSESSMENT — ENCOUNTER SYMPTOMS
DENIES PAIN: 1
LAST BOWEL MOVEMENT: 67100
APPETITE LEVEL: GOOD
PERSON REPORTING PAIN: PATIENT
CHANGE IN APPETITE: UNCHANGED

## 2024-09-18 ASSESSMENT — ACTIVITIES OF DAILY LIVING (ADL)
OASIS_M1830: 00
ENTERING_EXITING_HOME: INDEPENDENT

## 2024-10-04 ENCOUNTER — HOME INFUSION (OUTPATIENT)
Dept: INFUSION THERAPY | Age: 24
End: 2024-10-04
Payer: COMMERCIAL

## 2024-10-04 NOTE — PROGRESS NOTES
Chart review - patient ordered Entyvio q6 weeks for UC  Next infusion 10/11 - email sent to confirm with RN team  Does not take PO premeds     Auth good through 2/20/25  Oct FOTM complete   [employer] in date     Processed fill for the following for delivery 10/10 for infusion on 10/11:  1x Entyvio 300mg vial  1x 10ml SWFI diluent   1x 250ml NS  1x 50ml NS   DOS 10/11-11/21     Next dose due approx 11/22     Follow up 11/14 check next infusion date

## 2024-10-11 ENCOUNTER — HOME CARE VISIT (OUTPATIENT)
Dept: HOME HEALTH SERVICES | Facility: HOME HEALTH | Age: 24
End: 2024-10-11
Payer: COMMERCIAL

## 2024-10-11 VITALS
RESPIRATION RATE: 16 BRPM | DIASTOLIC BLOOD PRESSURE: 80 MMHG | SYSTOLIC BLOOD PRESSURE: 129 MMHG | TEMPERATURE: 98.4 F | HEART RATE: 82 BPM

## 2024-10-11 PROCEDURE — 99601 HOME NFS VISIT <2 HRS: CPT

## 2024-10-11 PROCEDURE — G0299 HHS/HOSPICE OF RN EA 15 MIN: HCPCS

## 2024-10-11 ASSESSMENT — ENCOUNTER SYMPTOMS
CHANGE IN APPETITE: UNCHANGED
DENIES PAIN: 1
LAST BOWEL MOVEMENT: 67123
APPETITE LEVEL: GOOD
PERSON REPORTING PAIN: PATIENT

## 2024-11-04 ENCOUNTER — OFFICE VISIT (OUTPATIENT)
Dept: URGENT CARE | Age: 24
End: 2024-11-04
Payer: COMMERCIAL

## 2024-11-04 VITALS
TEMPERATURE: 98.1 F | DIASTOLIC BLOOD PRESSURE: 79 MMHG | HEART RATE: 103 BPM | SYSTOLIC BLOOD PRESSURE: 119 MMHG | WEIGHT: 234 LBS | BODY MASS INDEX: 34.81 KG/M2 | RESPIRATION RATE: 21 BRPM | OXYGEN SATURATION: 98 %

## 2024-11-04 DIAGNOSIS — G44.83 COUGH HEADACHE: ICD-10-CM

## 2024-11-04 DIAGNOSIS — J01.10 ACUTE NON-RECURRENT FRONTAL SINUSITIS: Primary | ICD-10-CM

## 2024-11-04 LAB
POC RAPID STREP: NEGATIVE
POC SARS-COV-2 AG BINAX: NORMAL

## 2024-11-04 RX ORDER — AMOXICILLIN AND CLAVULANATE POTASSIUM 875; 125 MG/1; MG/1
1 TABLET, FILM COATED ORAL 2 TIMES DAILY
Qty: 20 TABLET | Refills: 0 | Status: SHIPPED | OUTPATIENT
Start: 2024-11-04 | End: 2024-11-14

## 2024-11-04 NOTE — PROGRESS NOTES
Subjective   Patient ID: Torres Bales is a 24 y.o. male. They present today with a chief complaint of Cough (Coughing nasal congestion x 4 days).    History of Present Illness  24-year-old male presents urgent care for complaint of sinus pressure and congestion and postnasal drip, cough and sore throat for past 4 to 5 days.  Denies any current fevers or chills, nausea vomiting sweats, chest pain shortness breath, abdominal pain, ear pain.  COVID and strep negative.  States he had sinus infections in the past and states this feels the exact same.  States he has taken Augmentin the past without complication.  Prescribed Augmentin and educated on supportive care.  Patient declines Flonase.  Follow-up with PCP in 1 to 2 weeks, return/ER precautions, patient agrees with plan.          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    Curvature of spine    Elevated C-reactive protein (CRP)     Elevated C-reactive protein (CRP)    Elevated erythrocyte sedimentation rate     Elevated erythrocyte sedimentation rate    Encounter for screening for osteoporosis 08/30/2019    Osteoporosis screening    Long term (current) use of immunosuppressive biologic 05/17/2019    Infliximab (Remicade) long-term use    Otitis media, unspecified, left ear 10/24/2017    Left otitis media    Personal history of diseases of the skin and subcutaneous tissue 01/09/2018    History of acne    Personal history of immunosuppression therapy 09/22/2020    History of immunosuppression    Personal history of other diseases of the musculoskeletal system and connective tissue 09/17/2019    History of back pain    Personal history of other diseases of the nervous system and sense organs 09/30/2014    History of otitis media    Personal  history of other diseases of the respiratory system     History of sore throat    Personal history of other diseases of the respiratory system 02/26/2020    History of sore throat    Personal history of other endocrine, nutritional and metabolic disease 09/11/2015    History of vitamin D deficiency    Personal history of other specified conditions 11/14/2019    History of diarrhea    Personal history of other specified conditions 02/07/2018    History of abdominal pain    Personal history of other specified conditions 12/13/2017    History of diarrhea    Unspecified otitis externa, left ear 10/24/2017    Left otitis externa       Past Surgical History:   Procedure Laterality Date    OTHER SURGICAL HISTORY  05/17/2019    Colonoscopy    OTHER SURGICAL HISTORY  05/17/2019    Esophagogastroduodenoscopy        reports that he has been smoking cigarettes. He has never been exposed to tobacco smoke. He has never used smokeless tobacco. He reports current alcohol use. He reports that he does not use drugs.    Review of Systems  Review of Systems   All other systems reviewed and are negative.                                 Objective    Vitals:    11/04/24 1219   BP: 119/79   BP Location: Left arm   Pulse: 103   Resp: 21   Temp: 36.7 °C (98.1 °F)   SpO2: 98%   Weight: 106 kg (234 lb)     No LMP for male patient.    Physical Exam  Vitals reviewed.   Constitutional:       General: He is not in acute distress.     Appearance: Normal appearance. He is not ill-appearing, toxic-appearing or diaphoretic.   HENT:      Head: Normocephalic and atraumatic.      Comments: Frontal sinus tenderness     Right Ear: Tympanic membrane, ear canal and external ear normal.      Left Ear: Tympanic membrane, ear canal and external ear normal.      Nose: Congestion and rhinorrhea present.      Mouth/Throat:      Mouth: Mucous membranes are moist.      Comments: Postnasal drip.  Cardiovascular:      Rate and Rhythm: Normal rate and regular rhythm.    Pulmonary:      Effort: Pulmonary effort is normal. No respiratory distress.      Breath sounds: Normal breath sounds. No stridor. No wheezing, rhonchi or rales.   Abdominal:      General: Abdomen is flat.      Palpations: Abdomen is soft.      Tenderness: There is no abdominal tenderness. There is no right CVA tenderness or left CVA tenderness.   Musculoskeletal:      Cervical back: Normal range of motion and neck supple. No rigidity or tenderness.   Lymphadenopathy:      Cervical: No cervical adenopathy.   Skin:     General: Skin is warm and dry.   Neurological:      General: No focal deficit present.      Mental Status: He is alert and oriented to person, place, and time.   Psychiatric:         Mood and Affect: Mood normal.         Behavior: Behavior normal.         Procedures    Point of Care Test & Imaging Results from this visit  Results for orders placed or performed in visit on 11/04/24   POCT Covid-19 Rapid Antigen   Result Value Ref Range    POC ROSA-COV-2 AG  Presumptive negative test for SARS-CoV-2 (no antigen detected)     Presumptive negative test for SARS-CoV-2 (no antigen detected)   POCT rapid strep A manually resulted   Result Value Ref Range    POC Rapid Strep Negative Negative      No results found.    Diagnostic study results (if any) were reviewed by Estefania Christie PA-C.    Assessment/Plan   Allergies, medications, history, and pertinent labs/EKGs/Imaging reviewed by Estefania Christie PA-C.     Medical Decision Making  24-year-old male presents urgent care for complaint of sinus pressure and congestion and postnasal drip, cough and sore throat for past 4 to 5 days.  Denies any current fevers or chills, nausea vomiting sweats, chest pain shortness breath, abdominal pain, ear pain.  COVID and strep negative.  States he had sinus infections in the past and states this feels the exact same.  States he has taken Augmentin the past without complication.  Prescribed Augmentin and educated on  supportive care.  Patient declines Flonase.  Follow-up with PCP in 1 to 2 weeks, return/ER precautions, patient agrees with plan.    Orders and Diagnoses  Diagnoses and all orders for this visit:  Cough headache  -     POCT Covid-19 Rapid Antigen  -     POCT rapid strep A manually resulted      Medical Admin Record      Patient disposition: Home    Electronically signed by Estefania Christie PA-C  12:43 PM

## 2024-11-15 ENCOUNTER — HOME CARE VISIT (OUTPATIENT)
Dept: HOME HEALTH SERVICES | Facility: HOME HEALTH | Age: 24
End: 2024-11-15
Payer: COMMERCIAL

## 2024-11-15 VITALS
DIASTOLIC BLOOD PRESSURE: 82 MMHG | HEART RATE: 79 BPM | SYSTOLIC BLOOD PRESSURE: 118 MMHG | TEMPERATURE: 97.9 F | RESPIRATION RATE: 18 BRPM | OXYGEN SATURATION: 79 %

## 2024-11-15 PROCEDURE — G0299 HHS/HOSPICE OF RN EA 15 MIN: HCPCS

## 2024-11-15 PROCEDURE — 99601 HOME NFS VISIT <2 HRS: CPT

## 2024-11-15 ASSESSMENT — ENCOUNTER SYMPTOMS
APPETITE LEVEL: FAIR
DENIES PAIN: 1
CHANGE IN APPETITE: UNCHANGED
LAST BOWEL MOVEMENT: 67157
PERSON REPORTING PAIN: PATIENT

## 2024-11-15 ASSESSMENT — ACTIVITIES OF DAILY LIVING (ADL)
ENTERING_EXITING_HOME: INDEPENDENT
OASIS_M1830: 00

## 2024-11-20 ENCOUNTER — HOME INFUSION (OUTPATIENT)
Dept: INFUSION THERAPY | Age: 24
End: 2024-11-20
Payer: COMMERCIAL

## 2024-11-20 NOTE — PROGRESS NOTES
Chart review - patient ordered Entyvio q6 weeks for UC  Next infusion 11/22 - confirmed with RN team  Does not take PO premeds     Auth good through 2/19/25  Nov FOTM complete   [employer] in date     Processed fill for the following for dispense and delivery 11/21 for infusion on 11/22:  1x Entyvio 300mg vial  1x 10ml SWFI diluent   1x 250ml NS  1x 50ml NS   DOS 11/22-1/2     Next dose due approx 1/3     Follow up 12/27 check next infusion date, forward as appropriate

## 2024-11-21 ENCOUNTER — DOCUMENTATION (OUTPATIENT)
Dept: INFUSION THERAPY | Age: 24
End: 2024-11-21
Payer: COMMERCIAL

## 2024-11-21 NOTE — PROGRESS NOTES
Spoke w/ patient - delivery of the infusion medication and supplies is today by 9 pm. Verified correct address.  No questions.

## 2024-11-22 ENCOUNTER — HOME CARE VISIT (OUTPATIENT)
Dept: HOME HEALTH SERVICES | Facility: HOME HEALTH | Age: 24
End: 2024-11-22
Payer: COMMERCIAL

## 2024-11-22 VITALS
TEMPERATURE: 98.1 F | RESPIRATION RATE: 16 BRPM | DIASTOLIC BLOOD PRESSURE: 70 MMHG | HEART RATE: 81 BPM | SYSTOLIC BLOOD PRESSURE: 114 MMHG

## 2024-11-22 PROCEDURE — G0299 HHS/HOSPICE OF RN EA 15 MIN: HCPCS

## 2024-11-22 PROCEDURE — 99601 HOME NFS VISIT <2 HRS: CPT

## 2024-11-22 ASSESSMENT — ENCOUNTER SYMPTOMS
LAST BOWEL MOVEMENT: 67165
DENIES PAIN: 1
CHANGE IN APPETITE: UNCHANGED
APPETITE LEVEL: GOOD
PERSON REPORTING PAIN: PATIENT

## 2024-12-13 ENCOUNTER — APPOINTMENT (OUTPATIENT)
Dept: GASTROENTEROLOGY | Facility: CLINIC | Age: 24
End: 2024-12-13
Payer: COMMERCIAL

## 2025-01-02 ENCOUNTER — HOME INFUSION (OUTPATIENT)
Dept: INFUSION THERAPY | Age: 25
End: 2025-01-02
Payer: COMMERCIAL

## 2025-01-02 NOTE — PROGRESS NOTES
PATIENT CONITNUES ENTYVIO [id number] FOR ULCERATIVE COLITIS.  NEXT INFUSION CONFIRMED FOR 1/6/25 WITH PHARMACY DELIVERY ON 1/3/25.  PT. REP TO ARRANGE DELIVERY WITH PATIENT.  AUTH IS GOOD THRU 2/19/15   FOTM COMPLETE FOR JANUARY.    PROCESSED FILL FOR THE FOLLOWING FOR MIX AND DELIVERY ON FRI 1/3/25.  1 ENTYVIO 300MG VIAL  1 10ML SWFI VIAL  1 250NS BAG  1 50ML NS BAG.    DOS 1/6/25 THRU 2/16/25.  RPH TO F/U APPROX. 2/12/25 TO CHECK ON NEXT INFUSION AND FEB FOTM. DSL

## 2025-01-06 ENCOUNTER — HOME CARE VISIT (OUTPATIENT)
Dept: HOME HEALTH SERVICES | Facility: HOME HEALTH | Age: 25
End: 2025-01-06
Payer: COMMERCIAL

## 2025-01-06 ENCOUNTER — DOCUMENTATION (OUTPATIENT)
Dept: INFUSION THERAPY | Age: 25
End: 2025-01-06
Payer: COMMERCIAL

## 2025-01-06 VITALS
TEMPERATURE: 98.2 F | DIASTOLIC BLOOD PRESSURE: 86 MMHG | HEART RATE: 77 BPM | RESPIRATION RATE: 16 BRPM | SYSTOLIC BLOOD PRESSURE: 118 MMHG

## 2025-01-06 PROCEDURE — 99601 HOME NFS VISIT <2 HRS: CPT

## 2025-01-06 PROCEDURE — G0299 HHS/HOSPICE OF RN EA 15 MIN: HCPCS

## 2025-01-06 ASSESSMENT — ENCOUNTER SYMPTOMS
APPETITE LEVEL: GOOD
PERSON REPORTING PAIN: PATIENT
CHANGE IN APPETITE: UNCHANGED
DENIES PAIN: 1
LAST BOWEL MOVEMENT: 67210

## 2025-01-07 NOTE — PROGRESS NOTES
Spoke with patient. Delivery was Friday 1/3 by 9pm with the  to call ahead of time. Same address as last delivery confirmed.     Sent drug, standard supplies for 1 dose of Entyvio  delivered peripherally  via [facility] + necessary flushes.     Patient had no questions for pharmacist.

## 2025-01-13 ENCOUNTER — HOME CARE VISIT (OUTPATIENT)
Dept: HOME HEALTH SERVICES | Facility: HOME HEALTH | Age: 25
End: 2025-01-13
Payer: COMMERCIAL

## 2025-01-13 ENCOUNTER — LAB (OUTPATIENT)
Dept: LAB | Facility: LAB | Age: 25
End: 2025-01-13
Payer: COMMERCIAL

## 2025-01-13 ENCOUNTER — APPOINTMENT (OUTPATIENT)
Dept: GASTROENTEROLOGY | Facility: CLINIC | Age: 25
End: 2025-01-13
Payer: COMMERCIAL

## 2025-01-13 VITALS — HEIGHT: 68 IN | OXYGEN SATURATION: 97 % | BODY MASS INDEX: 34.86 KG/M2 | WEIGHT: 230 LBS | HEART RATE: 87 BPM

## 2025-01-13 VITALS
RESPIRATION RATE: 16 BRPM | SYSTOLIC BLOOD PRESSURE: 114 MMHG | TEMPERATURE: 97.3 F | HEART RATE: 93 BPM | DIASTOLIC BLOOD PRESSURE: 86 MMHG | OXYGEN SATURATION: 99 %

## 2025-01-13 DIAGNOSIS — K51.00 ULCERATIVE PANCOLITIS WITHOUT COMPLICATION (MULTI): Primary | ICD-10-CM

## 2025-01-13 DIAGNOSIS — K51.00 ULCERATIVE PANCOLITIS WITHOUT COMPLICATION (MULTI): ICD-10-CM

## 2025-01-13 DIAGNOSIS — K21.9 GASTROESOPHAGEAL REFLUX DISEASE, UNSPECIFIED WHETHER ESOPHAGITIS PRESENT: ICD-10-CM

## 2025-01-13 LAB
ALBUMIN SERPL BCP-MCNC: 4.7 G/DL (ref 3.4–5)
ALP SERPL-CCNC: 73 U/L (ref 33–120)
ALT SERPL W P-5'-P-CCNC: 32 U/L (ref 10–52)
ANION GAP SERPL CALC-SCNC: 14 MMOL/L (ref 10–20)
AST SERPL W P-5'-P-CCNC: 23 U/L (ref 9–39)
BASOPHILS # BLD AUTO: 0.06 X10*3/UL (ref 0–0.1)
BASOPHILS NFR BLD AUTO: 0.5 %
BILIRUB SERPL-MCNC: 0.5 MG/DL (ref 0–1.2)
BUN SERPL-MCNC: 12 MG/DL (ref 6–23)
CALCIUM SERPL-MCNC: 10.1 MG/DL (ref 8.6–10.6)
CHLORIDE SERPL-SCNC: 102 MMOL/L (ref 98–107)
CO2 SERPL-SCNC: 27 MMOL/L (ref 21–32)
CREAT SERPL-MCNC: 0.9 MG/DL (ref 0.5–1.3)
CRP SERPL-MCNC: 0.43 MG/DL
EGFRCR SERPLBLD CKD-EPI 2021: >90 ML/MIN/1.73M*2
EOSINOPHIL # BLD AUTO: 0.29 X10*3/UL (ref 0–0.7)
EOSINOPHIL NFR BLD AUTO: 2.6 %
ERYTHROCYTE [DISTWIDTH] IN BLOOD BY AUTOMATED COUNT: 12.4 % (ref 11.5–14.5)
GLUCOSE SERPL-MCNC: 87 MG/DL (ref 74–99)
HCT VFR BLD AUTO: 49.2 % (ref 41–52)
HGB BLD-MCNC: 16.1 G/DL (ref 13.5–17.5)
IMM GRANULOCYTES # BLD AUTO: 0.12 X10*3/UL (ref 0–0.7)
IMM GRANULOCYTES NFR BLD AUTO: 1.1 % (ref 0–0.9)
LYMPHOCYTES # BLD AUTO: 2.96 X10*3/UL (ref 1.2–4.8)
LYMPHOCYTES NFR BLD AUTO: 26.8 %
MCH RBC QN AUTO: 27.8 PG (ref 26–34)
MCHC RBC AUTO-ENTMCNC: 32.7 G/DL (ref 32–36)
MCV RBC AUTO: 85 FL (ref 80–100)
MONOCYTES # BLD AUTO: 0.6 X10*3/UL (ref 0.1–1)
MONOCYTES NFR BLD AUTO: 5.4 %
NEUTROPHILS # BLD AUTO: 7 X10*3/UL (ref 1.2–7.7)
NEUTROPHILS NFR BLD AUTO: 63.6 %
NRBC BLD-RTO: 0 /100 WBCS (ref 0–0)
PLATELET # BLD AUTO: 301 X10*3/UL (ref 150–450)
POTASSIUM SERPL-SCNC: 4.2 MMOL/L (ref 3.5–5.3)
PROT SERPL-MCNC: 7.8 G/DL (ref 6.4–8.2)
RBC # BLD AUTO: 5.79 X10*6/UL (ref 4.5–5.9)
SODIUM SERPL-SCNC: 139 MMOL/L (ref 136–145)
WBC # BLD AUTO: 11 X10*3/UL (ref 4.4–11.3)

## 2025-01-13 PROCEDURE — 85025 COMPLETE CBC W/AUTO DIFF WBC: CPT

## 2025-01-13 PROCEDURE — 86481 TB AG RESPONSE T-CELL SUSP: CPT

## 2025-01-13 PROCEDURE — 99213 OFFICE O/P EST LOW 20 MIN: CPT | Performed by: INTERNAL MEDICINE

## 2025-01-13 PROCEDURE — 36415 COLL VENOUS BLD VENIPUNCTURE: CPT

## 2025-01-13 PROCEDURE — G0299 HHS/HOSPICE OF RN EA 15 MIN: HCPCS

## 2025-01-13 PROCEDURE — 99601 HOME NFS VISIT <2 HRS: CPT

## 2025-01-13 PROCEDURE — 86140 C-REACTIVE PROTEIN: CPT

## 2025-01-13 PROCEDURE — 80053 COMPREHEN METABOLIC PANEL: CPT

## 2025-01-13 PROCEDURE — 3008F BODY MASS INDEX DOCD: CPT | Performed by: INTERNAL MEDICINE

## 2025-01-13 ASSESSMENT — ENCOUNTER SYMPTOMS
LAST BOWEL MOVEMENT: 67217
DENIES PAIN: 1
PERSON REPORTING PAIN: PATIENT
APPETITE LEVEL: GOOD
CHANGE IN APPETITE: UNCHANGED

## 2025-01-13 ASSESSMENT — ACTIVITIES OF DAILY LIVING (ADL)
OASIS_M1830: 00
ENTERING_EXITING_HOME: INDEPENDENT

## 2025-01-13 NOTE — PROGRESS NOTES
REASON FOR VISIT: Follow-up ulcerative colitis    HPI:  Torres Bales is a 24 y.o. male with a past medical history of severe ulcerative colitis currently managed on Entyvio infusions every 6 weeks.  Has been on Entyvio infusions now for about 5 years.  Prior to that had been on Remicade for 2 years.  Last colonoscopy almost 2 years ago with normal colon and no evidence of active colitis.  Feeling great overall without any diarrhea, abdominal pain or rectal bleeding.  Tolerating his infusions well.  GERD symptoms overall controlled on PPI therapy as needed with omeprazole.  No dysphagia.        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    Curvature of spine    Elevated C-reactive protein (CRP)     Elevated C-reactive protein (CRP)    Elevated erythrocyte sedimentation rate     Elevated erythrocyte sedimentation rate    Encounter for screening for osteoporosis 08/30/2019    Osteoporosis screening    Long term (current) use of immunosuppressive biologic 05/17/2019    Infliximab (Remicade) long-term use    Otitis media, unspecified, left ear 10/24/2017    Left otitis media    Personal history of diseases of the skin and subcutaneous tissue 01/09/2018    History of acne    Personal history of immunosuppression therapy 09/22/2020    History of immunosuppression    Personal history of other diseases of the musculoskeletal system and connective tissue 09/17/2019    History of back pain    Personal history of other diseases of the nervous system and sense organs 09/30/2014    History of otitis media    Personal history of other diseases of the respiratory system     History of sore throat    Personal history of other diseases of the respiratory system 02/26/2020    History of sore throat    Personal history of other endocrine, nutritional and metabolic disease 09/11/2015    History of vitamin D deficiency    Personal history of other  specified conditions 11/14/2019    History of diarrhea    Personal history of other specified conditions 02/07/2018    History of abdominal pain    Personal history of other specified conditions 12/13/2017    History of diarrhea    Unspecified otitis externa, left ear 10/24/2017    Left otitis externa       PAST SURGICAL HISTORY  Past Surgical History:   Procedure Laterality Date    OTHER SURGICAL HISTORY  05/17/2019    Colonoscopy    OTHER SURGICAL HISTORY  05/17/2019    Esophagogastroduodenoscopy       FAMILY HISTORY  Family History   Problem Relation Name Age of Onset    Diabetes Mother         SOCIAL HISTORY  Social History     Tobacco Use    Smoking status: Some Days     Types: Cigarettes     Passive exposure: Never    Smokeless tobacco: Never   Substance Use Topics    Alcohol use: Yes     Comment: rarely       REVIEW OF SYSTEMS  CONSTITUTIONAL: negative for fever, chills, fatigue, or unintentional weight loss,   HEENT: negative for icteric sclera, eye pain/redness, or changes in vision/hearing  RESPIRATORY: negative for cough, hemoptysis, wheezing, orthopnea, or dyspnea on exertion  CARDIOVASCULAR: negative for chest pain, palpitations, or syncope   GASTROINTESTINAL: as noted per HPI  GENITOURINARY: negative for dysuria, polyuria, incontinence, or hematuria  MUSCULOSKELETAL: negative for arthralgia, myalgia, or joint swelling/stiffness   INTEGUMENTARY/SKIN: negative jaundice, rash, or skin lesion  HEMATOLOGIC/LYMPHATIC: negative for prolonged bleeding, easy bruising, or swollen lymph nodes  ENDOCRINE: negative for cold/heat intolerance, polydipsia, polyuria, or goiter  NEUROLOGIC: negative for headaches, dizziness, tremor, or gait abnormality  PSYCHIATRIC: negative for anxiety, depression, personality changes, or sleep disturbances      A 10 point review of systems was completed and was otherwise negative.    ALLERGIES  Allergies   Allergen Reactions    Aminocaproic Acid Unknown       MEDICATIONS  Current  "Outpatient Medications   Medication Sig Dispense Refill    omeprazole (PriLOSEC) 40 mg DR capsule Take 1 capsule (40 mg) by mouth once daily. 90 capsule 1    vedolizumab (Entyvio) 300 mg injection Infuse 300 mg into a venous catheter every 6 weeks.      0.9 % sodium chloride (sodium chloride 0.9%) solution Infuse 50 mL into a venous catheter every 6 weeks. after entyvio infusion  Indications: line care      cyclobenzaprine (Flexeril) 10 mg tablet Take 1 tablet (10 mg) by mouth 3 times a day as needed for muscle spasms for up to 10 days. 20 tablet 0    sodium chloride 0.9% (sodium chloride) flush Infuse 10 mL into a venous catheter every 6 weeks. with entyvio infusion   before and after administration on medication  Indications: line care       No current facility-administered medications for this visit.       VITALS  Pulse 87   Ht 1.727 m (5' 8\")   Wt 104 kg (230 lb)   SpO2 97%   BMI 34.97 kg/m²      PHYSICAL EXAM  Alert and oriented in no acute distress  Abdomen soft, no focal tenderness to palpation, no hepatosplenomegaly    ASSESSMENT/ PLAN  Patient with history of severe ulcerative colitis managed on biologic therapy with Entyvio infusions every 6 weeks.  Doing well with that without any symptoms of diarrhea, rectal bleeding or abdominal pain.  Will continue with current therapy.  Will check serologies including CBC, comprehensive panel, CRP and TB serologies.  He will see me back otherwise in 6 months.  Will plan on repeat colonoscopy in about a year for surveillance.  He is in agreement with the plan.        Signature: Speedy Constantino MD    Date: 1/13/2025  Time: 3:34 PM    "

## 2025-01-16 LAB
NIL(NEG) CONTROL SPOT COUNT: NORMAL
PANEL A SPOT COUNT: 3
PANEL B SPOT COUNT: 0
POS CONTROL SPOT COUNT: NORMAL
T-SPOT. TB INTERPRETATION: NEGATIVE

## 2025-02-12 ENCOUNTER — HOME INFUSION (OUTPATIENT)
Dept: INFUSION THERAPY | Age: 25
End: 2025-02-12
Payer: COMMERCIAL

## 2025-02-12 NOTE — PROGRESS NOTES
PATIENT CONITNUES ENTYVIO Q6 WEEKS FOR ULCERATIVE COLITIS.  NEXT INFUSION CONFIRMED FOR 2/17/25 WITH PHARMACY DELIVERY ON 2/14/25.  PT. REP TO ARRANGE DELIVERY WITH PATIENT.  AUTH IS GOOD THRU 2/19/15   FOTM COMPLETE FOR FEB25.     PROCESSED FILL FOR THE FOLLOWING FOR 02/13 MIX AND OVN DELIVERY ON FRI 2/14/25.  1 ENTYVIO 300MG VIAL  1 10ML SWFI VIAL  1 250NS BAG  1 50ML NS BAG.     DOS 2/17/25 THRU 2/19/25.  NEXT INFUSION SHOULD BE AROUND 03/31/25    RPH TO F/U APPROX. 3/24/25 TO CHECK ON NEXT INFUSION

## 2025-02-13 ENCOUNTER — DOCUMENTATION (OUTPATIENT)
Dept: INFUSION THERAPY | Age: 25
End: 2025-02-13
Payer: COMMERCIAL

## 2025-02-13 NOTE — PROGRESS NOTES
Spoke w/ patient - delivery of the infusion medication and all supplies is scheduled for tomorrow evening. No questions to [facility] at this time.

## 2025-02-17 ENCOUNTER — HOME CARE VISIT (OUTPATIENT)
Dept: HOME HEALTH SERVICES | Facility: HOME HEALTH | Age: 25
End: 2025-02-17
Payer: COMMERCIAL

## 2025-02-17 VITALS
TEMPERATURE: 98.5 F | SYSTOLIC BLOOD PRESSURE: 104 MMHG | HEART RATE: 81 BPM | DIASTOLIC BLOOD PRESSURE: 78 MMHG | RESPIRATION RATE: 16 BRPM

## 2025-02-17 PROCEDURE — G0299 HHS/HOSPICE OF RN EA 15 MIN: HCPCS

## 2025-02-17 PROCEDURE — 99601 HOME NFS VISIT <2 HRS: CPT

## 2025-02-17 RX ADMIN — Medication 10 ML: at 11:00

## 2025-02-17 ASSESSMENT — ENCOUNTER SYMPTOMS
CHANGE IN APPETITE: UNCHANGED
APPETITE LEVEL: GOOD
PERSON REPORTING PAIN: PATIENT
LAST BOWEL MOVEMENT: 67253
DENIES PAIN: 1

## 2025-03-17 ENCOUNTER — APPOINTMENT (OUTPATIENT)
Dept: HOME HEALTH SERVICES | Facility: HOME HEALTH | Age: 25
End: 2025-03-17
Payer: COMMERCIAL

## 2025-03-17 VITALS
SYSTOLIC BLOOD PRESSURE: 118 MMHG | TEMPERATURE: 97.6 F | DIASTOLIC BLOOD PRESSURE: 88 MMHG | RESPIRATION RATE: 18 BRPM | OXYGEN SATURATION: 98 % | HEART RATE: 78 BPM

## 2025-03-17 PROCEDURE — 99601 HOME NFS VISIT <2 HRS: CPT

## 2025-03-17 PROCEDURE — G0299 HHS/HOSPICE OF RN EA 15 MIN: HCPCS

## 2025-03-17 ASSESSMENT — ENCOUNTER SYMPTOMS
APPETITE LEVEL: GOOD
PERSON REPORTING PAIN: PATIENT
CHANGE IN APPETITE: UNCHANGED
LAST BOWEL MOVEMENT: 67280
DENIES PAIN: 1

## 2025-03-17 ASSESSMENT — ACTIVITIES OF DAILY LIVING (ADL)
ENTERING_EXITING_HOME: INDEPENDENT
OASIS_M1830: 00

## 2025-03-27 ENCOUNTER — TELEPHONE (OUTPATIENT)
Dept: INFUSION THERAPY | Age: 25
End: 2025-03-27
Payer: COMMERCIAL

## 2025-03-27 ENCOUNTER — HOME INFUSION (OUTPATIENT)
Dept: INFUSION THERAPY | Age: 25
End: 2025-03-27
Payer: COMMERCIAL

## 2025-03-27 NOTE — PROGRESS NOTES
Chart review - patient ordered Entyvio q6 weeks for UC  Next infusion 3/31 - confirmed with RN team  Does not take PO premeds     Auth good through 2/20/26 March FOTM complete   New Epipen needed for this delivery     Processed fill for the following for dispense and delivery 3/28 for infusion on 3/31:  1x Entyvio 300mg vial  1x 10ml SWFI diluent   1x 250ml NS  1x 50ml NS   1x Epipen  DOS 3/31-5/11     Next dose due approx 5/12    New Rx needed for next infusion. [facility] to request today.     Follow up 4/4 check if Rx received and forward as appropriate

## 2025-03-27 NOTE — TELEPHONE ENCOUNTER
Spoke with patient. Delivery will be tomorrow by  9 pm with the  to call ahead of time. Last delivery was left in wrong location and  did not call. This issue will be passed on to dispatcher. Same address as last delivery confirmed.     Sending drug, standard supplies for 1 dose of Entyvio  delivered peripherally  via [employer] + necessary flushes & new Epi pen for jesse kit.     Patient had no questions for pharmacist

## 2025-03-31 ENCOUNTER — HOME CARE VISIT (OUTPATIENT)
Dept: HOME HEALTH SERVICES | Facility: HOME HEALTH | Age: 25
End: 2025-03-31
Payer: COMMERCIAL

## 2025-03-31 PROCEDURE — G0299 HHS/HOSPICE OF RN EA 15 MIN: HCPCS

## 2025-03-31 PROCEDURE — 99601 HOME NFS VISIT <2 HRS: CPT

## 2025-03-31 RX ADMIN — Medication 10 ML: at 11:30

## 2025-03-31 RX ADMIN — SODIUM CHLORIDE 50 ML: 9 INJECTION, SOLUTION INTRAVENOUS at 11:20

## 2025-04-02 VITALS
DIASTOLIC BLOOD PRESSURE: 78 MMHG | TEMPERATURE: 98.4 F | HEART RATE: 81 BPM | SYSTOLIC BLOOD PRESSURE: 120 MMHG | RESPIRATION RATE: 18 BRPM

## 2025-04-02 ASSESSMENT — ENCOUNTER SYMPTOMS
PERSON REPORTING PAIN: PATIENT
APPETITE LEVEL: GOOD
CHANGE IN APPETITE: UNCHANGED
DENIES PAIN: 1
HEADACHES: 1
LAST BOWEL MOVEMENT: 67295

## 2025-04-03 DIAGNOSIS — K51.919 ULCERATIVE COLITIS WITH COMPLICATION, UNSPECIFIED LOCATION (MULTI): ICD-10-CM

## 2025-05-08 ENCOUNTER — HOME INFUSION (OUTPATIENT)
Dept: INFUSION THERAPY | Age: 25
End: 2025-05-08
Payer: COMMERCIAL

## 2025-05-08 NOTE — PROGRESS NOTES
PATIENT CONTINUES ENTYVIO Q6W FOR UC.  NEW RX RECEIVED FROM MD WITH TWO REFILLS.  PATIENT DOES NOT TAKE ANY PREMEDS.  NEXT INFUISON FOR 5/12/25. PHARMACY TO DELIVER ON FRIDAY 5/9.  AUTH GOOD THROUGH 2/20/26.  FOTM COMPLETE.    PROCESSED FILL FOR THE FOLLOWING FOR DELIVERY ON FRIDAY 5/9:  1 ENTYVIO 300MG VIAL  1 10ML SWFI DILUENT  1 50ML NS FLUSH BAG  1 250ML NS INFUSION BAG.    DOS 5/12 THRU 7/22.  NEXT DOSE DUE APPROX. 6/23.  RPH TO F/U WITH RN ON NEXT INFUSION DATE AND CHECK FOTM ON 6/18. DSL

## 2025-05-09 ENCOUNTER — TELEPHONE (OUTPATIENT)
Dept: INFUSION THERAPY | Age: 25
End: 2025-05-09
Payer: COMMERCIAL

## 2025-05-09 NOTE — TELEPHONE ENCOUNTER
Spoke with patient. Delivery will be tonight by 9 pm  with the  to call ahead of time. Same address as last delivery. May leave on back porch if no one is home.     Sending drug, standard supplies for 1 dose of Entyvio  delivered peripherally  via [employer] + necessary flushes. Patient takes no premeds.     Patient had no questions for pharmacist

## 2025-05-12 ENCOUNTER — HOME CARE VISIT (OUTPATIENT)
Dept: HOME HEALTH SERVICES | Facility: HOME HEALTH | Age: 25
End: 2025-05-12
Payer: COMMERCIAL

## 2025-05-12 VITALS
OXYGEN SATURATION: 99 % | TEMPERATURE: 98 F | HEART RATE: 78 BPM | SYSTOLIC BLOOD PRESSURE: 112 MMHG | RESPIRATION RATE: 16 BRPM | DIASTOLIC BLOOD PRESSURE: 86 MMHG

## 2025-05-12 PROCEDURE — G0299 HHS/HOSPICE OF RN EA 15 MIN: HCPCS

## 2025-05-12 PROCEDURE — 99601 HOME NFS VISIT <2 HRS: CPT

## 2025-05-12 RX ADMIN — SODIUM CHLORIDE 50 ML: 9 INJECTION, SOLUTION INTRAVENOUS at 11:18

## 2025-05-12 RX ADMIN — Medication 10 ML: at 10:10

## 2025-05-12 RX ADMIN — Medication 10 ML: at 11:38

## 2025-05-12 ASSESSMENT — ENCOUNTER SYMPTOMS
APPETITE LEVEL: GOOD
LAST BOWEL MOVEMENT: 67337
PERSON REPORTING PAIN: PATIENT
CHANGE IN APPETITE: UNCHANGED
DENIES PAIN: 1

## 2025-05-12 ASSESSMENT — ACTIVITIES OF DAILY LIVING (ADL)
OASIS_M1830: 00
ENTERING_EXITING_HOME: INDEPENDENT

## 2025-05-17 PROCEDURE — 400014 HH F/U

## 2025-06-18 ENCOUNTER — HOME INFUSION (OUTPATIENT)
Dept: INFUSION THERAPY | Age: 25
End: 2025-06-18
Payer: COMMERCIAL

## 2025-06-18 ENCOUNTER — TELEPHONE (OUTPATIENT)
Dept: INFUSION THERAPY | Age: 25
End: 2025-06-18
Payer: COMMERCIAL

## 2025-06-18 NOTE — TELEPHONE ENCOUNTER
Spoke with patient. Delivery will be later this week on Friday with the  to call ahead of time. Same address as last delivery. OK to leave on back porch if no  one is home.     Sending drug, standard supplies for 1 dose of Entyvio  delivered peripherally  via [employer] + 2 NS flushes.     Patient had no questions for pharmacist

## 2025-06-18 NOTE — PROGRESS NOTES
PATIENT CONTINUES ENTYVIO Q6W FOR UC.  TWO OPEN REFILLS ON RX.  PATIENT DOES NOT TAKE ANY ORAL PREMEDS.  NEXT INFUSION FOR 6/23/25. PATIENT HAS MONDAYS OFF. PHARMACY TO DELIVER ON FRIDAY 5/20.  AUTH GOOD THROUGH 2/20/26.  FOTM COMPLETE.    MED PROFILE LAST REVIEWED ON 05/12 AND NO CHANGES IN THERAPY    APPT WITH DR ROWLAND ON 05/23 AFTER INFUSION VISIT     PROCESSED FILL FOR THE FOLLOWING FOR 06/19/25 DISPENSE AND DELIVERY ON FRIDAY 6/20:  1 ENTYVIO 300MG VIAL  1 10ML SWFI DILUENT  1 50ML NS FLUSH BAG  1 250ML NS INFUSION BAG.     DOS 6/23 THRU 8/03/25.  NEXT DOSE DUE APPROX. 8/04.    RPH TO F/U WITH RN ON NEXT INFUSION DATE ON 7/28. PLACE ON FOTM LIST  WILL BE ONE OPEN FILL ON RX. REVIEW MD NOTE FROM 06/23/25

## 2025-06-23 ENCOUNTER — APPOINTMENT (OUTPATIENT)
Dept: GASTROENTEROLOGY | Facility: CLINIC | Age: 25
End: 2025-06-23
Payer: COMMERCIAL

## 2025-06-23 ENCOUNTER — HOME CARE VISIT (OUTPATIENT)
Dept: HOME HEALTH SERVICES | Facility: HOME HEALTH | Age: 25
End: 2025-06-23
Payer: COMMERCIAL

## 2025-06-23 VITALS
HEART RATE: 86 BPM | DIASTOLIC BLOOD PRESSURE: 84 MMHG | RESPIRATION RATE: 18 BRPM | TEMPERATURE: 98.7 F | SYSTOLIC BLOOD PRESSURE: 114 MMHG

## 2025-06-23 VITALS — BODY MASS INDEX: 37.89 KG/M2 | WEIGHT: 250 LBS | HEIGHT: 68 IN | HEART RATE: 101 BPM

## 2025-06-23 DIAGNOSIS — K51.00 ULCERATIVE PANCOLITIS WITHOUT COMPLICATION (MULTI): Primary | ICD-10-CM

## 2025-06-23 PROCEDURE — 99601 HOME NFS VISIT <2 HRS: CPT

## 2025-06-23 PROCEDURE — 99213 OFFICE O/P EST LOW 20 MIN: CPT | Performed by: INTERNAL MEDICINE

## 2025-06-23 PROCEDURE — G0299 HHS/HOSPICE OF RN EA 15 MIN: HCPCS

## 2025-06-23 PROCEDURE — 3008F BODY MASS INDEX DOCD: CPT | Performed by: INTERNAL MEDICINE

## 2025-06-23 PROCEDURE — 4004F PT TOBACCO SCREEN RCVD TLK: CPT | Performed by: INTERNAL MEDICINE

## 2025-06-23 RX ADMIN — Medication 10 ML: at 10:50

## 2025-06-23 RX ADMIN — SODIUM CHLORIDE 50 ML: 9 INJECTION, SOLUTION INTRAVENOUS at 11:50

## 2025-06-23 RX ADMIN — Medication 10 ML: at 12:00

## 2025-06-23 ASSESSMENT — ENCOUNTER SYMPTOMS
PERSON REPORTING PAIN: PATIENT
LAST BOWEL MOVEMENT: 67378
DENIES PAIN: 1
CHANGE IN APPETITE: UNCHANGED
APPETITE LEVEL: GOOD

## 2025-06-23 NOTE — PROGRESS NOTES
REASON FOR VISIT: Follow-up ulcerative colitis    HPI:  Torres Bales is a 24 y.o. male with a past medical history of severe ulcerative colitis currently managed on Entyvio infusions every 6 weeks.  Doing well.  Denies abdominal pain, diarrhea or rectal bleeding.  Has been compliant with therapy.  Has been now on Entyvio since 2019.  Prior to that was on infliximab for 2 years.  Diagnosed in 2007 at age 7.  CRP has been normal.  Last colonoscopy a little over 2 years ago without inflammatory findings.          PRIOR ENDOSCOPY    PAST MEDICAL HISTORY  Medical History[1]    PAST SURGICAL HISTORY  Surgical History[2]    FAMILY HISTORY  Family History[3]    SOCIAL HISTORY  Social History     Tobacco Use    Smoking status: Some Days     Types: Cigarettes     Passive exposure: Never    Smokeless tobacco: Never   Substance Use Topics    Alcohol use: Yes     Comment: rarely       REVIEW OF SYSTEMS  CONSTITUTIONAL: negative for fever, chills, fatigue, or unintentional weight loss,   HEENT: negative for icteric sclera, eye pain/redness, or changes in vision/hearing  RESPIRATORY: negative for cough, hemoptysis, wheezing, orthopnea, or dyspnea on exertion  CARDIOVASCULAR: negative for chest pain, palpitations, or syncope   GASTROINTESTINAL: as noted per HPI  GENITOURINARY: negative for dysuria, polyuria, incontinence, or hematuria  MUSCULOSKELETAL: negative for arthralgia, myalgia, or joint swelling/stiffness   INTEGUMENTARY/SKIN: negative jaundice, rash, or skin lesion  HEMATOLOGIC/LYMPHATIC: negative for prolonged bleeding, easy bruising, or swollen lymph nodes  ENDOCRINE: negative for cold/heat intolerance, polydipsia, polyuria, or goiter  NEUROLOGIC: negative for headaches, dizziness, tremor, or gait abnormality  PSYCHIATRIC: negative for anxiety, depression, personality changes, or sleep disturbances      A 10 point review of systems was completed and was otherwise  "negative.    ALLERGIES  Allergies[4]    MEDICATIONS  Current Medications[5]    VITALS  Pulse 101   Ht 1.727 m (5' 8\")   Wt 113 kg (250 lb)   BMI 38.01 kg/m²      PHYSICAL EXAM  Alert oriented in no acute distress  Abdomen soft, no focal tenderness to palpation in all 4 quadrants    ASSESSMENT/ PLAN  Patient with history of severe ulcerative colitis.  Doing well on Entyvio infusions every 6 weeks.  Will continue current therapy which she has been on now for 6 years.  Will check CBC and comprehensive metabolic panel.  Will plan on surveillance colonoscopy in about 9 months.  He will see me back in the office in 6 months.  He is in agreement with the plan.        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    Curvature of spine    Elevated C-reactive protein (CRP)     Elevated C-reactive protein (CRP)    Elevated erythrocyte sedimentation rate     Elevated erythrocyte sedimentation rate    Encounter for screening for osteoporosis 08/30/2019    Osteoporosis screening    Long term (current) use of immunosuppressive biologic 05/17/2019    Infliximab (Remicade) long-term use    Otitis media, unspecified, left ear 10/24/2017    Left otitis media    Personal history of diseases of the skin and subcutaneous tissue 01/09/2018    History of acne    Personal history of immunosuppression therapy 09/22/2020    History of immunosuppression    Personal history of other diseases of the musculoskeletal system and connective tissue 09/17/2019    History of back pain    Personal history of other diseases of the nervous system and sense organs 09/30/2014    History of otitis media    Personal history of other diseases of the respiratory system     History of sore throat    Personal history of other diseases of the respiratory system 02/26/2020    History of sore throat    Personal history of other endocrine, nutritional " and metabolic disease 09/11/2015    History of vitamin D deficiency    Personal history of other specified conditions 11/14/2019    History of diarrhea    Personal history of other specified conditions 02/07/2018    History of abdominal pain    Personal history of other specified conditions 12/13/2017    History of diarrhea    Unspecified otitis externa, left ear 10/24/2017    Left otitis externa   [2]   Past Surgical History:  Procedure Laterality Date    OTHER SURGICAL HISTORY  05/17/2019    Colonoscopy    OTHER SURGICAL HISTORY  05/17/2019    Esophagogastroduodenoscopy   [3]   Family History  Problem Relation Name Age of Onset    Diabetes Mother     [4]   Allergies  Allergen Reactions    Aminocaproic Acid Unknown   [5]   Current Outpatient Medications   Medication Sig Dispense Refill    0.9 % sodium chloride (sodium chloride 0.9%) solution Infuse 50 mL into a venous catheter every 6 weeks. after entyvio infusion      omeprazole (PriLOSEC) 40 mg DR capsule Take 1 capsule (40 mg) by mouth once daily. 90 capsule 1    vedolizumab (Entyvio) 300 mg injection Infuse 300 mg into a venous catheter every 6 weeks. 5 mL 2    cyclobenzaprine (Flexeril) 10 mg tablet Take 1 tablet (10 mg) by mouth 3 times a day as needed for muscle spasms for up to 10 days. (Patient not taking: Reported on 6/23/2025) 20 tablet 0    sodium chloride 0.9% (sodium chloride) flush Infuse 10 mL into a venous catheter every 6 weeks. with entyvio infusion   before and after administration on medication  Indications: line care (Patient not taking: Reported on 6/23/2025)       No current facility-administered medications for this visit.

## 2025-06-24 LAB
ALBUMIN SERPL-MCNC: 4.4 G/DL (ref 3.6–5.1)
ALP SERPL-CCNC: 73 U/L (ref 36–130)
ALT SERPL-CCNC: 44 U/L (ref 9–46)
ANION GAP SERPL CALCULATED.4IONS-SCNC: 7 MMOL/L (CALC) (ref 7–17)
AST SERPL-CCNC: 26 U/L (ref 10–40)
BASOPHILS # BLD AUTO: 71 CELLS/UL (ref 0–200)
BASOPHILS NFR BLD AUTO: 0.6 %
BILIRUB SERPL-MCNC: 0.5 MG/DL (ref 0.2–1.2)
BUN SERPL-MCNC: 13 MG/DL (ref 7–25)
CALCIUM SERPL-MCNC: 9.8 MG/DL (ref 8.6–10.3)
CHLORIDE SERPL-SCNC: 103 MMOL/L (ref 98–110)
CO2 SERPL-SCNC: 28 MMOL/L (ref 20–32)
CREAT SERPL-MCNC: 0.85 MG/DL (ref 0.6–1.24)
EGFRCR SERPLBLD CKD-EPI 2021: 125 ML/MIN/1.73M2
EOSINOPHIL # BLD AUTO: 369 CELLS/UL (ref 15–500)
EOSINOPHIL NFR BLD AUTO: 3.1 %
ERYTHROCYTE [DISTWIDTH] IN BLOOD BY AUTOMATED COUNT: 12.7 % (ref 11–15)
GLUCOSE SERPL-MCNC: 88 MG/DL (ref 65–99)
HCT VFR BLD AUTO: 48.4 % (ref 38.5–50)
HGB BLD-MCNC: 15.5 G/DL (ref 13.2–17.1)
LYMPHOCYTES # BLD AUTO: 2654 CELLS/UL (ref 850–3900)
LYMPHOCYTES NFR BLD AUTO: 22.3 %
MCH RBC QN AUTO: 27.6 PG (ref 27–33)
MCHC RBC AUTO-ENTMCNC: 32 G/DL (ref 32–36)
MCV RBC AUTO: 86.3 FL (ref 80–100)
MONOCYTES # BLD AUTO: 1107 CELLS/UL (ref 200–950)
MONOCYTES NFR BLD AUTO: 9.3 %
NEUTROPHILS # BLD AUTO: 7699 CELLS/UL (ref 1500–7800)
NEUTROPHILS NFR BLD AUTO: 64.7 %
PLATELET # BLD AUTO: 292 THOUSAND/UL (ref 140–400)
PMV BLD REES-ECKER: 10.6 FL (ref 7.5–12.5)
POTASSIUM SERPL-SCNC: 4.5 MMOL/L (ref 3.5–5.3)
PROT SERPL-MCNC: 7.4 G/DL (ref 6.1–8.1)
RBC # BLD AUTO: 5.61 MILLION/UL (ref 4.2–5.8)
SODIUM SERPL-SCNC: 138 MMOL/L (ref 135–146)
WBC # BLD AUTO: 11.9 THOUSAND/UL (ref 3.8–10.8)

## 2025-06-27 ENCOUNTER — OFFICE VISIT (OUTPATIENT)
Dept: URGENT CARE | Age: 25
End: 2025-06-27
Payer: COMMERCIAL

## 2025-06-27 VITALS
WEIGHT: 245 LBS | OXYGEN SATURATION: 96 % | DIASTOLIC BLOOD PRESSURE: 76 MMHG | SYSTOLIC BLOOD PRESSURE: 113 MMHG | TEMPERATURE: 98.2 F | HEART RATE: 92 BPM | RESPIRATION RATE: 16 BRPM | BODY MASS INDEX: 37.25 KG/M2

## 2025-06-27 DIAGNOSIS — J01.00 ACUTE NON-RECURRENT MAXILLARY SINUSITIS: Primary | ICD-10-CM

## 2025-06-27 NOTE — PATIENT INSTRUCTIONS
I recommend Afrin and Flonase for nasal congestion. Consider a daily allergy pill such as Claritin or Zyrtec    May return here for no charge if not improving within 2-3 days despite above recommendations.    Please follow up with your primary provider within one week if symptoms do not improve.  You may schedule an appointment online at [email or web address] or call [phone]. Go to the Emergency Department if symptoms significantly worsen

## 2025-06-27 NOTE — PROGRESS NOTES
Subjective   Patient ID: Torres Bales is a 24 y.o. male. They present today with a chief complaint of Nasal Congestion (For 5 days).    History of Present Illness  Reports nasal congestion, sinus pressure of the cheeks and forehead x4-5 days. Denies fever, cough, sore throat. Has not taken medications.          Past Medical History  Allergies as of 06/27/2025 - Reviewed 06/27/2025   Allergen Reaction Noted    Aminocaproic acid Unknown 11/13/2023       Prescriptions Prior to Admission[1]     Medical History[2]    Surgical History[3]     reports that he has been smoking cigarettes. He has never been exposed to tobacco smoke. He has never used smokeless tobacco. He reports current alcohol use. He reports that he does not use drugs.    Review of Systems  Pertinent systems reviewed and were negative unless otherwise stated in HPI.    Objective    Vitals:    06/27/25 0833   BP: 113/76   BP Location: Left arm   Patient Position: Sitting   BP Cuff Size: Large adult   Pulse: 92   Resp: 16   Temp: 36.8 °C (98.2 °F)   TempSrc: Temporal   SpO2: 96%   Weight: 111 kg (245 lb)     No LMP for male patient.    Physical Exam  Constitutional:       General: He is not in acute distress.  HENT:      Right Ear: Tympanic membrane, ear canal and external ear normal.      Left Ear: Tympanic membrane, ear canal and external ear normal.      Nose: Congestion present. No rhinorrhea.      Mouth/Throat:      Mouth: Mucous membranes are moist.      Pharynx: No oropharyngeal exudate or posterior oropharyngeal erythema.   Eyes:      Conjunctiva/sclera: Conjunctivae normal.   Cardiovascular:      Rate and Rhythm: Normal rate and regular rhythm.   Pulmonary:      Effort: Pulmonary effort is normal. No respiratory distress.   Lymphadenopathy:      Cervical: No cervical adenopathy.   Skin:     Findings: No rash.         Diagnostic study results (if any) were reviewed by Jayy Rush PA-C.    Assessment/Plan   Allergies, medications, history,  and pertinent labs/EKGs/imaging reviewed by Jayy Rush PA-C.     Medical Decision Making  Given symptoms only for 4-5 days and no OTCs tried, viral vs allergic etiology most likely. Advised Afrin and Flonase. Avoid ibuprofen due to h/o UC. RTC if not improving within 2-3 days for consideration for bacterial sinusitis.    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    Curvature of spine    Elevated C-reactive protein (CRP)     Elevated C-reactive protein (CRP)    Elevated erythrocyte sedimentation rate     Elevated erythrocyte sedimentation rate    Encounter for screening for osteoporosis 08/30/2019    Osteoporosis screening    Long term (current) use of immunosuppressive biologic 05/17/2019    Infliximab (Remicade) long-term use    Otitis media, unspecified, left ear 10/24/2017    Left otitis media    Personal history of diseases of the skin and subcutaneous tissue 01/09/2018    History of acne    Personal history of immunosuppression therapy 09/22/2020    History of immunosuppression    Personal history of other diseases of the musculoskeletal system and connective tissue 09/17/2019    History of back pain    Personal history of other diseases of the nervous system and sense organs 09/30/2014    History of otitis media    Personal history of other diseases of the respiratory system     History of sore throat    Personal history of other diseases of the respiratory system 02/26/2020    History of sore throat    Personal history of other endocrine, nutritional and metabolic disease 09/11/2015    History of vitamin D deficiency    Personal history of other specified conditions 11/14/2019    History of diarrhea     Personal history of other specified conditions 02/07/2018    History of abdominal pain    Personal history of other specified conditions 12/13/2017    History of diarrhea    Unspecified otitis externa, left ear 10/24/2017    Left otitis externa   [3]   Past Surgical History:  Procedure Laterality Date    OTHER SURGICAL HISTORY  05/17/2019    Colonoscopy    OTHER SURGICAL HISTORY  05/17/2019    Esophagogastroduodenoscopy

## 2025-07-14 ENCOUNTER — HOME CARE VISIT (OUTPATIENT)
Dept: HOME HEALTH SERVICES | Facility: HOME HEALTH | Age: 25
End: 2025-07-14
Payer: COMMERCIAL

## 2025-07-14 VITALS
SYSTOLIC BLOOD PRESSURE: 104 MMHG | DIASTOLIC BLOOD PRESSURE: 60 MMHG | TEMPERATURE: 98.6 F | RESPIRATION RATE: 16 BRPM | HEART RATE: 84 BPM

## 2025-07-14 PROCEDURE — G0299 HHS/HOSPICE OF RN EA 15 MIN: HCPCS

## 2025-07-14 PROCEDURE — 99601 HOME NFS VISIT <2 HRS: CPT

## 2025-07-14 ASSESSMENT — ENCOUNTER SYMPTOMS
APPETITE LEVEL: GOOD
PERSON REPORTING PAIN: PATIENT
CHANGE IN APPETITE: UNCHANGED
DENIES PAIN: 1
LAST BOWEL MOVEMENT: 67399

## 2025-07-14 ASSESSMENT — ACTIVITIES OF DAILY LIVING (ADL)
ENTERING_EXITING_HOME: INDEPENDENT
OASIS_M1830: 00

## 2025-07-29 ENCOUNTER — APPOINTMENT (OUTPATIENT)
Dept: RADIOLOGY | Facility: HOSPITAL | Age: 25
End: 2025-07-29
Payer: COMMERCIAL

## 2025-07-29 ENCOUNTER — HOSPITAL ENCOUNTER (EMERGENCY)
Facility: HOSPITAL | Age: 25
Discharge: HOME | End: 2025-07-29
Attending: EMERGENCY MEDICINE
Payer: COMMERCIAL

## 2025-07-29 VITALS
DIASTOLIC BLOOD PRESSURE: 89 MMHG | HEIGHT: 69 IN | HEART RATE: 87 BPM | SYSTOLIC BLOOD PRESSURE: 135 MMHG | OXYGEN SATURATION: 98 % | RESPIRATION RATE: 15 BRPM | BODY MASS INDEX: 37.16 KG/M2 | TEMPERATURE: 97.9 F | WEIGHT: 250.88 LBS

## 2025-07-29 DIAGNOSIS — R06.02 SHORTNESS OF BREATH: Primary | ICD-10-CM

## 2025-07-29 LAB
ANION GAP SERPL CALCULATED.3IONS-SCNC: 13 MMOL/L (ref 10–20)
BASOPHILS # BLD AUTO: 0.06 X10*3/UL (ref 0–0.1)
BASOPHILS NFR BLD AUTO: 0.5 %
BUN SERPL-MCNC: 22 MG/DL (ref 6–23)
CALCIUM SERPL-MCNC: 9.7 MG/DL (ref 8.6–10.3)
CARDIAC TROPONIN I PNL SERPL HS: 4 NG/L (ref 0–20)
CHLORIDE SERPL-SCNC: 104 MMOL/L (ref 98–107)
CO2 SERPL-SCNC: 24 MMOL/L (ref 21–32)
CREAT SERPL-MCNC: 0.91 MG/DL (ref 0.5–1.3)
EGFRCR SERPLBLD CKD-EPI 2021: >90 ML/MIN/1.73M*2
EOSINOPHIL # BLD AUTO: 0.4 X10*3/UL (ref 0–0.7)
EOSINOPHIL NFR BLD AUTO: 3.4 %
ERYTHROCYTE [DISTWIDTH] IN BLOOD BY AUTOMATED COUNT: 12.4 % (ref 11.5–14.5)
GLUCOSE SERPL-MCNC: 90 MG/DL (ref 74–99)
HCT VFR BLD AUTO: 45.2 % (ref 41–52)
HGB BLD-MCNC: 15.2 G/DL (ref 13.5–17.5)
IMM GRANULOCYTES # BLD AUTO: 0.04 X10*3/UL (ref 0–0.7)
IMM GRANULOCYTES NFR BLD AUTO: 0.3 % (ref 0–0.9)
LYMPHOCYTES # BLD AUTO: 4.11 X10*3/UL (ref 1.2–4.8)
LYMPHOCYTES NFR BLD AUTO: 34.9 %
MCH RBC QN AUTO: 27.7 PG (ref 26–34)
MCHC RBC AUTO-ENTMCNC: 33.6 G/DL (ref 32–36)
MCV RBC AUTO: 83 FL (ref 80–100)
MONOCYTES # BLD AUTO: 1 X10*3/UL (ref 0.1–1)
MONOCYTES NFR BLD AUTO: 8.5 %
NEUTROPHILS # BLD AUTO: 6.16 X10*3/UL (ref 1.2–7.7)
NEUTROPHILS NFR BLD AUTO: 52.4 %
NRBC BLD-RTO: 0 /100 WBCS (ref 0–0)
PLATELET # BLD AUTO: 259 X10*3/UL (ref 150–450)
POTASSIUM SERPL-SCNC: 3.8 MMOL/L (ref 3.5–5.3)
RBC # BLD AUTO: 5.48 X10*6/UL (ref 4.5–5.9)
SODIUM SERPL-SCNC: 137 MMOL/L (ref 136–145)
WBC # BLD AUTO: 11.8 X10*3/UL (ref 4.4–11.3)

## 2025-07-29 PROCEDURE — 71045 X-RAY EXAM CHEST 1 VIEW: CPT | Mod: FOREIGN READ | Performed by: RADIOLOGY

## 2025-07-29 PROCEDURE — 85025 COMPLETE CBC W/AUTO DIFF WBC: CPT | Performed by: EMERGENCY MEDICINE

## 2025-07-29 PROCEDURE — 84484 ASSAY OF TROPONIN QUANT: CPT | Performed by: EMERGENCY MEDICINE

## 2025-07-29 PROCEDURE — 36415 COLL VENOUS BLD VENIPUNCTURE: CPT | Performed by: EMERGENCY MEDICINE

## 2025-07-29 PROCEDURE — 96360 HYDRATION IV INFUSION INIT: CPT

## 2025-07-29 PROCEDURE — 71045 X-RAY EXAM CHEST 1 VIEW: CPT

## 2025-07-29 PROCEDURE — 2500000004 HC RX 250 GENERAL PHARMACY W/ HCPCS (ALT 636 FOR OP/ED): Performed by: EMERGENCY MEDICINE

## 2025-07-29 PROCEDURE — 99284 EMERGENCY DEPT VISIT MOD MDM: CPT | Mod: 25 | Performed by: EMERGENCY MEDICINE

## 2025-07-29 PROCEDURE — 80048 BASIC METABOLIC PNL TOTAL CA: CPT | Performed by: EMERGENCY MEDICINE

## 2025-07-29 RX ORDER — ALBUTEROL SULFATE 90 UG/1
2 INHALANT RESPIRATORY (INHALATION) EVERY 4 HOURS PRN
Qty: 18 G | Refills: 0 | Status: SHIPPED | OUTPATIENT
Start: 2025-07-29 | End: 2025-08-28

## 2025-07-29 RX ADMIN — SODIUM CHLORIDE 1000 ML: 900 INJECTION, SOLUTION INTRAVENOUS at 04:05

## 2025-07-29 ASSESSMENT — PAIN - FUNCTIONAL ASSESSMENT: PAIN_FUNCTIONAL_ASSESSMENT: 0-10

## 2025-07-29 ASSESSMENT — PAIN DESCRIPTION - DESCRIPTORS
DESCRIPTORS: PRESSURE
DESCRIPTORS: PRESSURE

## 2025-07-29 ASSESSMENT — HEART SCORE
AGE: <45
ECG: NORMAL
HISTORY: SLIGHTLY SUSPICIOUS
HEART SCORE: 0
RISK FACTORS: NO KNOWN RISK FACTORS
TROPONIN: LESS THAN OR EQUAL TO NORMAL LIMIT

## 2025-07-29 ASSESSMENT — PAIN DESCRIPTION - PAIN TYPE: TYPE: ACUTE PAIN

## 2025-07-29 ASSESSMENT — PAIN DESCRIPTION - PROGRESSION: CLINICAL_PROGRESSION: NOT CHANGED

## 2025-07-29 ASSESSMENT — PAIN SCALES - GENERAL: PAINLEVEL_OUTOF10: 3

## 2025-07-29 ASSESSMENT — PAIN DESCRIPTION - ONSET: ONSET: SUDDEN

## 2025-07-29 ASSESSMENT — PAIN DESCRIPTION - FREQUENCY: FREQUENCY: INTERMITTENT

## 2025-07-29 ASSESSMENT — PAIN DESCRIPTION - LOCATION: LOCATION: CHEST

## 2025-07-29 NOTE — DISCHARGE INSTRUCTIONS
Thank you for allowing us to care for you today.  You may receive a survey regarding your visit today.    If you were satisfied with the care and service provided we would be very grateful if you would give us a high rating.    Your feedback is very important to us.    Dr. Ozuna        As of today's visit, based on reasonable likelihood, that it is safe for you to be discharged back to your residence to follow-up as an outpatient for ongoing management of your medical problem. You should follow-up with any referrals / primary provider as soon as possible. The contacts (number, addresses) are listed below.         Important:  Even though we think it is safe for you to go home, there is always a small chance that we are missing something that could require hospitalization.  Therefore it is very important that if you get worse or develops any new symptoms that you return here as soon as possible to be re-evaluated.  This includes return of symptoms that have resolved such as fainting, chest pain, or symptoms that could be warning signs for stroke important:  Even though we think it is safe for you to go home, there is always a small chance that we are missing something that could require hospitalization.  Therefore it is very important that if you get worse or develops any new symptoms that you return here as soon as possible to be re-evaluated.  This includes return of symptoms that have resolved such as fainting, chest pain, or symptoms that could be warning signs for stroke         Make sure your pharmacy and primary doctor is aware of any new medications prescribed today.          It is your responsibility to contact as soon as possible, and follow through with, any referrals you were given today. We do recommend you inform them you are a [facility] follow-up patient, as often they can better accommodate your need to be seen, provided their schedules allow. We will, and have, made every effort to ensure you have  access to adequate follow-up specialists available.          All problems may not be able to be fixed in one ER visit. This is why timely ongoing care is important, and this is a responsibility you share in. Further, you are free to follow up with any provider you choose, and this is not limited to our suggestion.          If cultures were obtained today, you will be contacted should anything result that would require further treatment. Please contact the ED at the number provided with questions.          Having trouble affording medications? Try [facility]! (This is not a hospital endorsed website, merely a recommendation based on my own personal experiences with [facility]

## 2025-07-29 NOTE — ED PROVIDER NOTES
HPI   Chief Complaint   Patient presents with    Chest Pain     Pt has had some intermittent chest pain for the last week. Pt has had episodes of palpitations while he is working in the sun.       HPI  24 old male presents complaining of shortness of breath.  Patient had some issues over the last week where he has had some palpitations and short of breath that was worse from his working outside in the sun yesterday.  Does not describe any specific pain in the chest some pressure.  No nausea vomiting fevers or chills.  He did not take any meds for his symptoms.  No symptoms in the ED right now.  No other complaints.      Patient History   Medical History[1]  Surgical History[2]  Family History[3]  Social History[4]    Physical Exam   ED Triage Vitals [07/29/25 0327]   Temperature Heart Rate Respirations BP   36.6 °C (97.9 °F) 92 16 132/85      Pulse Ox Temp Source Heart Rate Source Patient Position   99 % Temporal Monitor Sitting      BP Location FiO2 (%)     Right arm --       Physical Exam  General:  Awake, alert, no acute distress.  Head: Normocephalic, Atraumatic  Neck: Supple, trachea midline, no stridor  Skin: Warm and dry, no rashes   Lungs: Clear to auscultation bilaterally no acute respiratory distress, speaking in full sentences without difficulty  CV: Regular Rate Rhythm with no obvious murmurs gallops rubs noted, no jugular venous distention, no pedal edema, +2/4 radial pulses bilaterally  Abdomen: Soft, nontender, nondistended, positive bowel sounds, no peritoneal signs  Neuro:  No gross focal neurologic deficits, NIH is 0  Musculoskeletal:  Full range of motion in all 4 extremities  Psychiatric:  Alert oriented x 3, Good insight into condition.    ED Course & MDM   Diagnoses as of 07/29/25 0450   Shortness of breath                 No data recorded                                 Medical Decision Making  My EKG interpretation at 0 333:  Normal sinus rhythm 90 bpm normal axis NH interval 152 QTc 428 no  ectopy or acute ischemic changes noted    Patient's workup in the ED is unremarkable.  He is currently asymptomatic.  I do not suspect PE with this patient.  There may be a reactive airway component given the fact that it was worse with working in the heat.  I prescribed a Proventil inhaler for home.  Follow-up with his doctor.  Return if the condition worsens.  He is stable for discharge.    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    Curvature of spine    Elevated C-reactive protein (CRP)     Elevated C-reactive protein (CRP)    Elevated erythrocyte sedimentation rate     Elevated erythrocyte sedimentation rate    Encounter for screening for osteoporosis 08/30/2019    Osteoporosis screening    Long term (current) use of immunosuppressive biologic 05/17/2019    Infliximab (Remicade) long-term use    Otitis media, unspecified, left ear 10/24/2017    Left otitis media    Personal history of diseases of the skin and subcutaneous tissue 01/09/2018    History of acne    Personal history of immunosuppression therapy 09/22/2020    History of immunosuppression    Personal history of other diseases of the musculoskeletal system and connective tissue 09/17/2019    History of back pain    Personal history of other diseases of the nervous system and sense organs 09/30/2014    History of otitis media    Personal history of other diseases of the respiratory system     History of sore throat    Personal history of other diseases of the respiratory system 02/26/2020    History of sore throat    Personal history of other endocrine, nutritional and metabolic disease 09/11/2015    History of vitamin D deficiency    Personal history of other specified conditions 11/14/2019    History of diarrhea    Personal history of other specified conditions 02/07/2018    History of abdominal pain    Personal history of other  specified conditions 12/13/2017    History of diarrhea    Unspecified otitis externa, left ear 10/24/2017    Left otitis externa   [2]   Past Surgical History:  Procedure Laterality Date    OTHER SURGICAL HISTORY  05/17/2019    Colonoscopy    OTHER SURGICAL HISTORY  05/17/2019    Esophagogastroduodenoscopy   [3]   Family History  Problem Relation Name Age of Onset    Diabetes Mother     [4]   Social History  Tobacco Use    Smoking status: Some Days     Types: Cigarettes     Passive exposure: Never    Smokeless tobacco: Never   Substance Use Topics    Alcohol use: Yes     Comment: rarely    Drug use: Never        Jonathan Ozuna DO  07/29/25 0548

## 2025-08-04 ENCOUNTER — HOME INFUSION (OUTPATIENT)
Dept: INFUSION THERAPY | Age: 25
End: 2025-08-04
Payer: COMMERCIAL

## 2025-08-04 ENCOUNTER — DOCUMENTATION (OUTPATIENT)
Dept: INFUSION THERAPY | Age: 25
End: 2025-08-04

## 2025-08-04 ENCOUNTER — HOME CARE VISIT (OUTPATIENT)
Dept: HOME HEALTH SERVICES | Facility: HOME HEALTH | Age: 25
End: 2025-08-04
Payer: COMMERCIAL

## 2025-08-04 VITALS
DIASTOLIC BLOOD PRESSURE: 80 MMHG | TEMPERATURE: 99.3 F | SYSTOLIC BLOOD PRESSURE: 110 MMHG | RESPIRATION RATE: 18 BRPM | HEART RATE: 90 BPM

## 2025-08-04 DIAGNOSIS — K51.919 ULCERATIVE COLITIS WITH COMPLICATION, UNSPECIFIED LOCATION (MULTI): ICD-10-CM

## 2025-08-04 PROCEDURE — 99601 HOME NFS VISIT <2 HRS: CPT

## 2025-08-04 PROCEDURE — G0299 HHS/HOSPICE OF RN EA 15 MIN: HCPCS

## 2025-08-04 RX ADMIN — Medication 10 ML: at 16:55

## 2025-08-04 RX ADMIN — SODIUM CHLORIDE 50 ML: 9 INJECTION, SOLUTION INTRAVENOUS at 16:40

## 2025-08-04 RX ADMIN — Medication 10 ML: at 15:40

## 2025-08-04 ASSESSMENT — ENCOUNTER SYMPTOMS
CHANGE IN APPETITE: UNCHANGED
PERSON REPORTING PAIN: PATIENT
APPETITE LEVEL: GOOD
DENIES PAIN: 1
LAST BOWEL MOVEMENT: 67421

## 2025-08-04 NOTE — PROGRESS NOTES
Ticket made from pharmacist note:    Sending today before 2 pm with the  to call first. Sending to same address as last delivery. Ok to leave on back porch if no one is home.     Sending standard supplies for 1 dose of Entyvio  q6w delivered peripherally  via [employer] + necessary flushes.    Patient previously spoke with a pharmacist.

## 2025-08-04 NOTE — PROGRESS NOTES
PATIENT CONTINUES ENTYVIO Q6W FOR UC.  ONE OPEN REFILL ON RX.  PATIENT DOES NOT TAKE ANY ORAL PREMEDS.  NEXT INFUSION FOR 8/04/25 AT 3:30PM. PATIENT HAS MONDAYS OFF. PHARMACY TO DELIVER ON MONDAY 8/04 BY 2PM.  AUTH GOOD THROUGH 2/20/26.  FOTM COMPLETE FOR AUGUST.     MED PROFILE LAST REVIEWED AND NO CHANGES IN THERAPY     APPT WITH DR ROWLAND ON 06/23 AFTER LAST INFUSION VISIT. PATIENT WITH NO CHANGES IN ENTYVIO THERAPY. RX RECEIVED TO CONTINUE X 6 MONTH (THROUGH 01/31/26)  ORDERS ENTERED IN [employer] AND SENT TO .  PROFILED IN [facility].     PROCESSED FILL FOR THE FOLLOWING FOR 08/04/25 DISPENSE AND DELIVERY ON MONDAY 8/04 BY 2PM:  1 ENTYVIO 300MG VIAL  1 10ML SWFI DILUENT  1 50ML NS FLUSH BAG  1 250ML NS INFUSION BAG.     DOS 8/04 THRU 9/14/25.  NEXT DOSE DUE APPROX. 9/15. TYPICALLY DOSES OON MONDAYS DUE TO WORK SCHEDULE.     FOLLOW UP 09/08/25 WITH RN TEAM ON NEXT SCHEDULED INFUSION AND DELIVER as APPROPRIATE/.

## 2025-08-15 ENCOUNTER — APPOINTMENT (OUTPATIENT)
Dept: PRIMARY CARE | Facility: CLINIC | Age: 25
End: 2025-08-15
Payer: COMMERCIAL

## 2025-08-18 ENCOUNTER — TELEPHONE (OUTPATIENT)
Dept: PRIMARY CARE | Facility: CLINIC | Age: 25
End: 2025-08-18

## 2025-08-18 ENCOUNTER — APPOINTMENT (OUTPATIENT)
Dept: PRIMARY CARE | Facility: CLINIC | Age: 25
End: 2025-08-18
Payer: COMMERCIAL

## 2025-08-18 VITALS
WEIGHT: 251 LBS | DIASTOLIC BLOOD PRESSURE: 80 MMHG | HEART RATE: 98 BPM | SYSTOLIC BLOOD PRESSURE: 130 MMHG | HEIGHT: 69 IN | OXYGEN SATURATION: 99 % | BODY MASS INDEX: 37.18 KG/M2

## 2025-08-18 DIAGNOSIS — K21.9 GASTROESOPHAGEAL REFLUX DISEASE, UNSPECIFIED WHETHER ESOPHAGITIS PRESENT: ICD-10-CM

## 2025-08-18 DIAGNOSIS — K44.9 HIATAL HERNIA: ICD-10-CM

## 2025-08-18 DIAGNOSIS — Z00.00 ROUTINE GENERAL MEDICAL EXAMINATION AT A HEALTH CARE FACILITY: ICD-10-CM

## 2025-08-18 DIAGNOSIS — Z13.220 LIPID SCREENING: Primary | ICD-10-CM

## 2025-08-18 DIAGNOSIS — R73.01 IMPAIRED FASTING GLUCOSE: ICD-10-CM

## 2025-08-18 PROCEDURE — 3008F BODY MASS INDEX DOCD: CPT | Performed by: STUDENT IN AN ORGANIZED HEALTH CARE EDUCATION/TRAINING PROGRAM

## 2025-08-18 PROCEDURE — 99395 PREV VISIT EST AGE 18-39: CPT | Performed by: STUDENT IN AN ORGANIZED HEALTH CARE EDUCATION/TRAINING PROGRAM

## 2025-08-18 ASSESSMENT — PROMIS GLOBAL HEALTH SCALE
CARRYOUT_PHYSICAL_ACTIVITIES: COMPLETELY
RATE_PHYSICAL_HEALTH: GOOD
RATE_GENERAL_HEALTH: GOOD
RATE_QUALITY_OF_LIFE: GOOD
RATE_MENTAL_HEALTH: VERY GOOD
RATE_SOCIAL_SATISFACTION: EXCELLENT
CARRYOUT_SOCIAL_ACTIVITIES: VERY GOOD
RATE_AVERAGE_FATIGUE: MODERATE
EMOTIONAL_PROBLEMS: RARELY
RATE_AVERAGE_PAIN: 2

## 2025-08-18 ASSESSMENT — ENCOUNTER SYMPTOMS
DIZZINESS: 0
CHILLS: 0
NAUSEA: 0
RHINORRHEA: 0
FEVER: 0
CONSTIPATION: 0
WHEEZING: 0
SHORTNESS OF BREATH: 0
FREQUENCY: 0
SLEEP DISTURBANCE: 0
WOUND: 0
POLYDIPSIA: 0
NERVOUS/ANXIOUS: 0
MYALGIAS: 0
SINUS PRESSURE: 0
PALPITATIONS: 0
LIGHT-HEADEDNESS: 0
DYSPHORIC MOOD: 0
VOMITING: 0
HEADACHES: 0
FATIGUE: 0
SINUS PAIN: 0
DIARRHEA: 0
ARTHRALGIAS: 0
COUGH: 0
DYSURIA: 0

## 2025-08-18 ASSESSMENT — COLUMBIA-SUICIDE SEVERITY RATING SCALE - C-SSRS: 1. IN THE PAST MONTH, HAVE YOU WISHED YOU WERE DEAD OR WISHED YOU COULD GO TO SLEEP AND NOT WAKE UP?: NO

## 2025-08-18 ASSESSMENT — PATIENT HEALTH QUESTIONNAIRE - PHQ9
2. FEELING DOWN, DEPRESSED OR HOPELESS: NOT AT ALL
1. LITTLE INTEREST OR PLEASURE IN DOING THINGS: NOT AT ALL
SUM OF ALL RESPONSES TO PHQ9 QUESTIONS 1 AND 2: 0

## 2025-08-18 ASSESSMENT — PAIN SCALES - GENERAL: PAINLEVEL_OUTOF10: 0-NO PAIN

## 2026-08-24 ENCOUNTER — APPOINTMENT (OUTPATIENT)
Dept: PRIMARY CARE | Facility: CLINIC | Age: 26
End: 2026-08-24
Payer: COMMERCIAL